# Patient Record
Sex: FEMALE | Race: WHITE | NOT HISPANIC OR LATINO | Employment: OTHER | URBAN - METROPOLITAN AREA
[De-identification: names, ages, dates, MRNs, and addresses within clinical notes are randomized per-mention and may not be internally consistent; named-entity substitution may affect disease eponyms.]

---

## 2018-01-18 ENCOUNTER — HOSPITAL ENCOUNTER (EMERGENCY)
Facility: MEDICAL CENTER | Age: 83
End: 2018-01-18
Attending: EMERGENCY MEDICINE
Payer: MEDICARE

## 2018-01-18 ENCOUNTER — APPOINTMENT (OUTPATIENT)
Dept: RADIOLOGY | Facility: MEDICAL CENTER | Age: 83
End: 2018-01-18
Attending: EMERGENCY MEDICINE
Payer: MEDICARE

## 2018-01-18 VITALS
SYSTOLIC BLOOD PRESSURE: 129 MMHG | RESPIRATION RATE: 18 BRPM | WEIGHT: 140 LBS | HEIGHT: 65 IN | DIASTOLIC BLOOD PRESSURE: 61 MMHG | HEART RATE: 65 BPM | OXYGEN SATURATION: 94 % | BODY MASS INDEX: 23.32 KG/M2 | TEMPERATURE: 97.5 F

## 2018-01-18 DIAGNOSIS — M17.11 OSTEOARTHRITIS OF RIGHT KNEE, UNSPECIFIED OSTEOARTHRITIS TYPE: ICD-10-CM

## 2018-01-18 PROCEDURE — 73562 X-RAY EXAM OF KNEE 3: CPT | Mod: RT

## 2018-01-18 PROCEDURE — 73521 X-RAY EXAM HIPS BI 2 VIEWS: CPT

## 2018-01-18 PROCEDURE — 99284 EMERGENCY DEPT VISIT MOD MDM: CPT

## 2018-01-18 ASSESSMENT — PAIN SCALES - GENERAL: PAINLEVEL_OUTOF10: 0

## 2018-01-18 NOTE — ED PROVIDER NOTES
"ED Provider Note    ED Provider Note    Primary care provider: Jeet Grimes M.D.  Means of arrival: EMS  History obtained from: Patient    CHIEF COMPLAINT  Chief Complaint   Patient presents with   • T-5000 GLF   • Hip Pain     right     Seen at 1:49 AM.   HPI  Rozina Houser is a 89 y.o. female who presents to the Emergency Department after a possible fall. The history is unavailable. The patient was brought in by EMS and reports that the patient had a fall prior to arrival. The patient is without complaints. She is sitting up in the gurney asking why she is here.    Per EMS, the patient received 300 µg of fentanyl and 1 mg of Versed.    REVIEW OF SYSTEMS  See HPI, the patient denies any headache, neck pain, chest pain, back pain, hip pain. She does state her right knee is sore. Full review of systems is unreliable secondary to dementia.    PAST MEDICAL HISTORY   has a past medical history of Colon polyps; Diabetes; GERD (gastroesophageal reflux disease); HTN; Hyperglycemia; Hyperlipidemia; Macular degeneration; PVD (peripheral vascular disease) (CMS-HCC); Skin cancer; and Stroke (CMS-HCC).    SURGICAL HISTORY   has a past surgical history that includes appendectomy; tonsillectomy; athroplasty; cholecystectomy; colectomy; and other orthopedic surgery.    SOCIAL HISTORY  Social History   Substance Use Topics   • Smoking status: Never Smoker   • Smokeless tobacco: Not on file   • Alcohol use No      History   Drug Use No       FAMILY HISTORY  Family History   Problem Relation Age of Onset   • Heart Disease Mother        CURRENT MEDICATIONS  Reviewed.  See Encounter Summary.     ALLERGIES  Allergies   Allergen Reactions   • Fosamax    • Pravachol [Pravastatin Sodium]    • Premarin    • Sulfa Drugs        PHYSICAL EXAM  VITAL SIGNS: /61   Pulse 65   Temp 36.4 °C (97.5 °F)   Resp 18   Ht 1.651 m (5' 5\")   Wt 63.5 kg (140 lb)   SpO2 94%   BMI 23.30 kg/m²   Constitutional: Awake, alert in no " apparent distress.  HENT: Normocephalic, atraumatic, Bilateral external ears normal. Nose normal. No midline cervical tenderness, Nexus Criteria Negative.   Eyes: Conjunctiva normal, non-icteric, EOMI.    Thorax & Lungs: Easy unlabored respirations, Clear to ascultation bilaterally.  Cardiovascular: Regular rate, Regular rhythm, No murmurs, rubs or gallops.  Abdomen:  Soft, nontender, nondistended, normal active bowel sounds.   :    Skin: Visualized skin is  Dry, No erythema, No rash.   Musculoskeletal: Bilateral upper extremities are nontender, bilateral hips have full range of motion and are nontender. There is some mild right knee tenderness on the right. The knee has full flexion and extension. There is no obvious sign of trauma.   Neurologic: Alert, Grossly non-focal.   Psychiatric: Normal affect, Normal mood  Lymphatic:  No cervical LAD      RADIOLOGY  DX-KNEE 3 VIEWS RIGHT   Final Result      1.  Negative for right knee fracture or malalignment      2.  Tricompartmental osteoarthritis      DX-HIP-BILATERAL-WITH PELVIS-2 VIEWS   Final Result      1.  No pelvic or proximal femoral fracture identified      2.  osteoarthritis of both hip joint, both sacroiliac joints, and lumbar spine facet joint        The radiologist's interpretation of all radiological studies have been reviewed by me.    COURSE & MEDICAL DECISION MAKING  Pertinent Labs & Imaging studies reviewed. (See chart for details)    Differential diagnoses include but are not limited to: Knee contusion, knee fracture, hip fracture, pelvic fracture    1:49 AM - Medical record reviewed, patient seen and examined at bedside.    Decision Making:  This is a 89 y.o. year old female who presents after a reported fall. The patient does not have any sign of head trauma and she is currently without complaints. Therefore do not feel that a head CT or cervical spine CT are clinically indicated. There was some suggestion of hip tenderness on the outside reports,  for this reason pelvic x-rays were taken that are negative. The patient has full range of motion of the bilateral hips, I do not suspect occult hip fracture. Her only complaint was right knee soreness, the joint was grossly normal in appearance. She has full range of motion. X-ray show severe osteoarthritis but no acute fracture.    At this point the patient will be discharged back to the nursing facility.The patient's blood pressure is elevated today. >120/80. I have referred them to primary care for follow up.       Discharge Medications:  Discharge Medication List as of 1/18/2018  4:19 AM          The patient will be discharged to home in good condition.    FINAL IMPRESSION  1. Osteoarthritis of right knee, unspecified osteoarthritis type

## 2018-01-18 NOTE — DISCHARGE PLANNING
Medical Social Work  SW asked to assist with transport home .  SW arranged for transportation back to [facility]  PCS completed and [facility] tranportation arranged.    RN notified and transfer packet left with chart.

## 2018-01-18 NOTE — ED NOTES
Pt SIXTO Martin from [facility] for GLF/ right hip pain. EMS reports pt did not hit her head. Pt is oriented to self and place only. Denies right hip pain, moves right leg without difficulty. No shortness or rotation noted. No visible injuries to head. VSS. Pt given 1 mg Versed and 300 mcg fentanyl PTA by EMS.

## 2018-01-18 NOTE — DISCHARGE INSTRUCTIONS
Osteoarthritis  Osteoarthritis is a disease that causes soreness and inflammation of a joint. It occurs when the cartilage at the affected joint wears down. Cartilage acts as a cushion, covering the ends of bones where they meet to form a joint. Osteoarthritis is the most common form of arthritis. It often occurs in older people. The joints affected most often by this condition include those in the:  · Ends of the fingers.  · Thumbs.  · Neck.  · Lower back.  · Knees.  · Hips.  CAUSES   Over time, the cartilage that covers the ends of bones begins to wear away. This causes bone to rub on bone, producing pain and stiffness in the affected joints.   RISK FACTORS  Certain factors can increase your chances of having osteoarthritis, including:  · Older age.  · Excessive body weight.  · Overuse of joints.  · Previous joint injury.  SIGNS AND SYMPTOMS   · Pain, swelling, and stiffness in the joint.  · Over time, the joint may lose its normal shape.  · Small deposits of bone (osteophytes) may grow on the edges of the joint.  · Bits of bone or cartilage can break off and float inside the joint space. This may cause more pain and damage.  DIAGNOSIS   Your health care provider will do a physical exam and ask about your symptoms. Various tests may be ordered, such as:  · X-rays of the affected joint.  · Blood tests to rule out other types of arthritis.  Additional tests may be used to diagnose your condition.  TREATMENT   Goals of treatment are to control pain and improve joint function. Treatment plans may include:  · A prescribed exercise program that allows for rest and joint relief.  · A weight control plan.  · Pain relief techniques, such as:  ¨ Properly applied heat and cold.  ¨ Electric pulses delivered to nerve endings under the skin (transcutaneous electrical nerve stimulation [TENS]).  ¨ Massage.  ¨ Certain nutritional supplements.  · Medicines to control pain, such as:  ¨ Acetaminophen.  ¨ Nonsteroidal  anti-inflammatory drugs (NSAIDs), such as naproxen.  ¨ Narcotic or central-acting agents, such as tramadol.  ¨ Corticosteroids. These can be given orally or as an injection.  · Surgery to reposition the bones and relieve pain (osteotomy) or to remove loose pieces of bone and cartilage. Joint replacement may be needed in advanced states of osteoarthritis.  HOME CARE INSTRUCTIONS   · Take medicines only as directed by your health care provider.  · Maintain a healthy weight. Follow your health care provider's instructions for weight control. This may include dietary instructions.  · Exercise as directed. Your health care provider can recommend specific types of exercise. These may include:  ¨ Strengthening exercises. These are done to strengthen the muscles that support joints affected by arthritis. They can be performed with weights or with exercise bands to add resistance.  ¨ Aerobic activities. These are exercises, such as brisk walking or low-impact aerobics, that get your heart pumping.  ¨ Range-of-motion activities. These keep your joints limber.  ¨ Balance and agility exercises. These help you maintain daily living skills.  · Rest your affected joints as directed by your health care provider.  · Keep all follow-up visits as directed by your health care provider.  SEEK MEDICAL CARE IF:   · Your skin turns red.  · You develop a rash in addition to your joint pain.  · You have worsening joint pain.  · You have a fever along with joint or muscle aches.  SEEK IMMEDIATE MEDICAL CARE IF:  · You have a significant loss of weight or appetite.  · You have night sweats.  FOR MORE INFORMATION   · National [place] of Arthritis and Musculoskeletal and Skin Diseases: www.niams.nih.gov  · National [place] on Aging: www.yanet.nih.gov  · American College of Rheumatology: www.rheumatology.org     This information is not intended to replace advice given to you by your health care provider. Make sure you discuss any questions you  have with your health care provider.     Document Released: 12/18/2006 Document Revised: 01/08/2016 Document Reviewed: 08/25/2014  Elsevier Interactive Patient Education ©2016 [employer]vier Inc.

## 2018-01-18 NOTE — ED NOTES
ELLE to transport pt to [facility]. VSS. NAD noted at time of transfer. All discharge instructions given to patient. Pt's belongings gathered and sent with pt.

## 2018-01-18 NOTE — ED NOTES
Removed pt's PIV, cath tip intact. Pressure and bandage applied. Called report to nurse at [facility], Nettie ALMEIDA to transport pt back to [facility] shortly. Pt updated on POC, pt continues to be disoriented to situation, place and time due to hx of dementia.

## 2019-01-01 ENCOUNTER — HOSPITAL ENCOUNTER (EMERGENCY)
Facility: MEDICAL CENTER | Age: 84
End: 2019-11-02
Attending: EMERGENCY MEDICINE
Payer: MEDICARE

## 2019-01-01 ENCOUNTER — APPOINTMENT (OUTPATIENT)
Dept: RADIOLOGY | Facility: MEDICAL CENTER | Age: 84
End: 2019-01-01
Attending: EMERGENCY MEDICINE
Payer: MEDICARE

## 2019-01-01 VITALS
TEMPERATURE: 98 F | HEART RATE: 77 BPM | WEIGHT: 141.09 LBS | OXYGEN SATURATION: 93 % | DIASTOLIC BLOOD PRESSURE: 67 MMHG | BODY MASS INDEX: 23.48 KG/M2 | SYSTOLIC BLOOD PRESSURE: 139 MMHG | RESPIRATION RATE: 18 BRPM

## 2019-01-01 DIAGNOSIS — S61.212A LACERATION OF RIGHT MIDDLE FINGER WITHOUT FOREIGN BODY WITHOUT DAMAGE TO NAIL, INITIAL ENCOUNTER: ICD-10-CM

## 2019-01-01 PROCEDURE — 73140 X-RAY EXAM OF FINGER(S): CPT | Mod: RT

## 2019-01-01 PROCEDURE — 304999 HCHG REPAIR-SIMPLE/INTERMED LEVEL 1

## 2019-01-01 PROCEDURE — 303353 HCHG DERMABOND SKIN ADHESIVE

## 2019-01-01 PROCEDURE — 99284 EMERGENCY DEPT VISIT MOD MDM: CPT

## 2019-11-02 NOTE — ED NOTES
ERP at bedside. Pt agrees with plan of care discussed by ERP. AIDET acknowledged with patient. Rusty in low position, side rail up for pt safety. Call light within reach. Will continue to monitor.

## 2019-11-02 NOTE — ED PROVIDER NOTES
ED Provider Note    CHIEF COMPLAINT  Chief Complaint   Patient presents with   • GLF   • Open Wound       HPI  Rozina Houser is a 89 y.o. female who presents with pain to the right third phalanx.  The patient has dementia therefore the history is from her family.  The patient is currently in a care facility as I DO NOT RESUSCITATE and unfortunately had a fall this morning.  It was noted the patient had a laceration and injury to her right third phalanx and she was sent into the emerge department for evaluation.  Otherwise the patient has not been complaining of any pain and family states she is at her baseline.  They are unaware of any other injuries.  The history is limited due to her dementia as mentioned above.  REVIEW OF SYSTEMS  See HPI for further details.  Unobtainable secondary to her dementia.     PAST MEDICAL HISTORY  Past Medical History:   Diagnosis Date   • Colon polyps    • Diabetes     pre diabetic   • GERD (gastroesophageal reflux disease)    • HTN    • Hyperglycemia    • Hyperlipidemia    • Macular degeneration    • PVD (peripheral vascular disease) (HCC)    • Skin cancer    • Stroke (HCC)        FAMILY HISTORY  [unfilled]    SOCIAL HISTORY  Social History     Socioeconomic History   • Marital status:      Spouse name: Not on file   • Number of children: Not on file   • Years of education: Not on file   • Highest education level: Not on file   Occupational History   • Not on file   Social Needs   • Financial resource strain: Not on file   • Food insecurity:     Worry: Not on file     Inability: Not on file   • Transportation needs:     Medical: Not on file     Non-medical: Not on file   Tobacco Use   • Smoking status: Never Smoker   Substance and Sexual Activity   • Alcohol use: No   • Drug use: No   • Sexual activity: Not on file   Lifestyle   • Physical activity:     Days per week: Not on file     Minutes per session: Not on file   • Stress: Not on file   Relationships   • Social  connections:     Talks on phone: Not on file     Gets together: Not on file     Attends Christianity service: Not on file     Active member of club or organization: Not on file     Attends meetings of clubs or organizations: Not on file     Relationship status: Not on file   • Intimate partner violence:     Fear of current or ex partner: Not on file     Emotionally abused: Not on file     Physically abused: Not on file     Forced sexual activity: Not on file   Other Topics Concern   • Not on file   Social History Narrative   • Not on file       SURGICAL HISTORY  Past Surgical History:   Procedure Laterality Date   • APPENDECTOMY     • ATHROPLASTY     • CHOLECYSTECTOMY     • COLECTOMY     • OTHER ORTHOPEDIC SURGERY      left knee replacement   • TONSILLECTOMY         CURRENT MEDICATIONS  Home Medications    **Home medications have not yet been reviewed for this encounter**         ALLERGIES  Allergies   Allergen Reactions   • Fosamax    • Pravachol [Pravastatin Sodium]    • Premarin    • Sulfa Drugs        PHYSICAL EXAM  VITAL SIGNS: /80   Pulse 73   Temp 36.7 °C (98 °F) (Temporal)   Resp 18   Wt 64 kg (141 lb 1.5 oz)   BMI 23.48 kg/m²  Room air O2: 90    Constitutional: No acute distress, Non-toxic appearance.   HENT: Normocephalic, Atraumatic, Bilateral external ears normal, Oropharynx moist, No oral exudates, Nose normal.   Eyes: PERRLA, EOMI, Conjunctiva normal, No discharge.   Neck: Normal range of motion, No tenderness, Supple, No stridor.   Lymphatic: No lymphadenopathy noted.   Cardiovascular: Normal heart rate, Normal rhythm, No murmurs, No rubs, No gallops.   Thorax & Lungs: Normal breath sounds, No respiratory distress, No wheezing, No chest tenderness.   Abdomen: Bowel sounds normal, Soft, No tenderness, No masses, No pulsatile masses.   Skin: Laceration to the dorsal aspect of the right third phalanx that goes over the PIP joint measuring approximately 2 cm   Extremities: The laceration described  above otherwise intact distal pulses, No edema, No tenderness, No cyanosis, No clubbing.   Neurologic: The patient is alert, she does speak and open her eyes spontaneously, she moves all 4 extremities to commands.  Family states that she is at her baseline..   Psychiatric: Deferred.     RADIOLOGY/  DX-FINGER(S) 2+ RIGHT   Final Result      1.  No acute fracture identified.      2.  Osteopenia        PROCEDURES  COURSE & MEDICAL DECISION MAKING  Pertinent Labs & Imaging studies reviewed. (See chart for details)  Is a 89-year-old female who presents after a suspected mechanical fall.  The patient is currently in assisted care facility and she is a DO NOT RESUSCITATE.  Family is unaware of any other traumatic injuries except for that to the right third phalanx.  Clinically I do not appreciate any evidence of further injuries as well.  She does have a laceration over the dorsal aspect of the PIP joint.  She has full flexion and extension.  Primary closure was performed with Dermabond.  The x-ray does not show any evidence for fracture.  The patient be discharged back to the assisted care facility with instructions for her to return for any signs of infection or for any further concerns from the fall.    FINAL IMPRESSION  1.  Mechanical fall  2.  2 cm laceration to the right third phalanx           Electronically signed by: Dov Harrison, 11/2/2019 12:06 PM

## 2019-11-02 NOTE — ED NOTES
Pt BIB EMS from care facility with son  Pt rolled out of bed and has a skin tear to R hand, wrapped PTA  Denies pain  Son reports normal mentation for pt, not taking blood thinner. Son reports she is a hospice pt

## 2020-01-01 ENCOUNTER — APPOINTMENT (OUTPATIENT)
Dept: RADIOLOGY | Facility: MEDICAL CENTER | Age: 85
DRG: 177 | End: 2020-01-01
Attending: EMERGENCY MEDICINE
Payer: MEDICARE

## 2020-01-01 ENCOUNTER — HOSPITAL ENCOUNTER (INPATIENT)
Facility: MEDICAL CENTER | Age: 85
LOS: 16 days | DRG: 177 | End: 2020-07-11
Attending: EMERGENCY MEDICINE | Admitting: INTERNAL MEDICINE
Payer: MEDICARE

## 2020-01-01 VITALS
WEIGHT: 146.61 LBS | RESPIRATION RATE: 18 BRPM | OXYGEN SATURATION: 79 % | SYSTOLIC BLOOD PRESSURE: 94 MMHG | BODY MASS INDEX: 26.98 KG/M2 | TEMPERATURE: 98.6 F | DIASTOLIC BLOOD PRESSURE: 44 MMHG | HEIGHT: 62 IN | HEART RATE: 113 BPM

## 2020-01-01 DIAGNOSIS — Z86.73 HISTORY OF CVA (CEREBROVASCULAR ACCIDENT): ICD-10-CM

## 2020-01-01 DIAGNOSIS — M25.551 RIGHT HIP PAIN: ICD-10-CM

## 2020-01-01 DIAGNOSIS — J18.9 PNEUMONIA OF LEFT LOWER LOBE DUE TO INFECTIOUS ORGANISM: ICD-10-CM

## 2020-01-01 DIAGNOSIS — U07.1 COVID-19 VIRUS INFECTION: ICD-10-CM

## 2020-01-01 DIAGNOSIS — J96.01 ACUTE RESPIRATORY FAILURE WITH HYPOXIA (HCC): ICD-10-CM

## 2020-01-01 DIAGNOSIS — Z20.822 SUSPECTED COVID-19 VIRUS INFECTION: ICD-10-CM

## 2020-01-01 LAB
ALBUMIN SERPL BCP-MCNC: 3.8 G/DL (ref 3.2–4.9)
ALP SERPL-CCNC: 81 U/L (ref 30–99)
ALT SERPL-CCNC: 17 U/L (ref 2–50)
ANION GAP SERPL CALC-SCNC: 10 MMOL/L (ref 7–16)
ANION GAP SERPL CALC-SCNC: 13 MMOL/L (ref 7–16)
ANION GAP SERPL CALC-SCNC: 14 MMOL/L (ref 7–16)
ANION GAP SERPL CALC-SCNC: 15 MMOL/L (ref 7–16)
ANION GAP SERPL CALC-SCNC: 16 MMOL/L (ref 7–16)
ANION GAP SERPL CALC-SCNC: 17 MMOL/L (ref 7–16)
APTT PPP: 37.6 SEC (ref 24.7–36)
AST SERPL-CCNC: 27 U/L (ref 12–45)
BASOPHILS # BLD AUTO: 0.3 % (ref 0–1.8)
BASOPHILS # BLD AUTO: 0.4 % (ref 0–1.8)
BASOPHILS # BLD AUTO: 0.4 % (ref 0–1.8)
BASOPHILS # BLD AUTO: 0.5 % (ref 0–1.8)
BASOPHILS # BLD AUTO: 0.6 % (ref 0–1.8)
BASOPHILS # BLD: 0.02 K/UL (ref 0–0.12)
BASOPHILS # BLD: 0.03 K/UL (ref 0–0.12)
BASOPHILS # BLD: 0.03 K/UL (ref 0–0.12)
BASOPHILS # BLD: 0.04 K/UL (ref 0–0.12)
BILIRUB CONJ SERPL-MCNC: 0.3 MG/DL (ref 0.1–0.5)
BILIRUB INDIRECT SERPL-MCNC: 0.5 MG/DL (ref 0–1)
BILIRUB SERPL-MCNC: 0.8 MG/DL (ref 0.1–1.5)
BUN SERPL-MCNC: 15 MG/DL (ref 8–22)
BUN SERPL-MCNC: 18 MG/DL (ref 8–22)
BUN SERPL-MCNC: 18 MG/DL (ref 8–22)
BUN SERPL-MCNC: 21 MG/DL (ref 8–22)
BUN SERPL-MCNC: 21 MG/DL (ref 8–22)
BUN SERPL-MCNC: 22 MG/DL (ref 8–22)
BUN SERPL-MCNC: 25 MG/DL (ref 8–22)
BUN SERPL-MCNC: 27 MG/DL (ref 8–22)
BUN SERPL-MCNC: 28 MG/DL (ref 8–22)
BUN SERPL-MCNC: 29 MG/DL (ref 8–22)
CALCIUM SERPL-MCNC: 8.4 MG/DL (ref 8.4–10.2)
CALCIUM SERPL-MCNC: 8.7 MG/DL (ref 8.4–10.2)
CALCIUM SERPL-MCNC: 8.8 MG/DL (ref 8.4–10.2)
CALCIUM SERPL-MCNC: 8.9 MG/DL (ref 8.4–10.2)
CALCIUM SERPL-MCNC: 8.9 MG/DL (ref 8.4–10.2)
CALCIUM SERPL-MCNC: 9 MG/DL (ref 8.4–10.2)
CALCIUM SERPL-MCNC: 9 MG/DL (ref 8.4–10.2)
CHLORIDE SERPL-SCNC: 100 MMOL/L (ref 96–112)
CHLORIDE SERPL-SCNC: 101 MMOL/L (ref 96–112)
CHLORIDE SERPL-SCNC: 102 MMOL/L (ref 96–112)
CHLORIDE SERPL-SCNC: 103 MMOL/L (ref 96–112)
CHLORIDE SERPL-SCNC: 103 MMOL/L (ref 96–112)
CHLORIDE SERPL-SCNC: 106 MMOL/L (ref 96–112)
CHLORIDE SERPL-SCNC: 113 MMOL/L (ref 96–112)
CHLORIDE SERPL-SCNC: 97 MMOL/L (ref 96–112)
CK SERPL-CCNC: 245 U/L (ref 0–154)
CO2 SERPL-SCNC: 18 MMOL/L (ref 20–33)
CO2 SERPL-SCNC: 20 MMOL/L (ref 20–33)
CO2 SERPL-SCNC: 21 MMOL/L (ref 20–33)
CO2 SERPL-SCNC: 21 MMOL/L (ref 20–33)
CO2 SERPL-SCNC: 22 MMOL/L (ref 20–33)
CO2 SERPL-SCNC: 22 MMOL/L (ref 20–33)
CO2 SERPL-SCNC: 23 MMOL/L (ref 20–33)
CO2 SERPL-SCNC: 24 MMOL/L (ref 20–33)
COVID ORDER STATUS COVID19: NORMAL
COVID ORDER STATUS COVID19: NORMAL
CREAT SERPL-MCNC: 0.79 MG/DL (ref 0.5–1.4)
CREAT SERPL-MCNC: 0.8 MG/DL (ref 0.5–1.4)
CREAT SERPL-MCNC: 0.81 MG/DL (ref 0.5–1.4)
CREAT SERPL-MCNC: 0.86 MG/DL (ref 0.5–1.4)
CREAT SERPL-MCNC: 0.87 MG/DL (ref 0.5–1.4)
CREAT SERPL-MCNC: 0.88 MG/DL (ref 0.5–1.4)
CREAT SERPL-MCNC: 0.9 MG/DL (ref 0.5–1.4)
CREAT SERPL-MCNC: 0.91 MG/DL (ref 0.5–1.4)
CREAT SERPL-MCNC: 0.93 MG/DL (ref 0.5–1.4)
CREAT SERPL-MCNC: 0.95 MG/DL (ref 0.5–1.4)
CRP SERPL HS-MCNC: 3.2 MG/DL (ref 0–0.75)
D DIMER PPP IA.FEU-MCNC: 1.13 UG/ML (FEU) (ref 0–0.5)
EOSINOPHIL # BLD AUTO: 0.01 K/UL (ref 0–0.51)
EOSINOPHIL # BLD AUTO: 0.02 K/UL (ref 0–0.51)
EOSINOPHIL # BLD AUTO: 0.13 K/UL (ref 0–0.51)
EOSINOPHIL NFR BLD: 0.1 % (ref 0–6.9)
EOSINOPHIL NFR BLD: 0.1 % (ref 0–6.9)
EOSINOPHIL NFR BLD: 0.2 % (ref 0–6.9)
EOSINOPHIL NFR BLD: 0.3 % (ref 0–6.9)
EOSINOPHIL NFR BLD: 0.3 % (ref 0–6.9)
EOSINOPHIL NFR BLD: 0.4 % (ref 0–6.9)
EOSINOPHIL NFR BLD: 2.2 % (ref 0–6.9)
ERYTHROCYTE [DISTWIDTH] IN BLOOD BY AUTOMATED COUNT: 40.7 FL (ref 35.9–50)
ERYTHROCYTE [DISTWIDTH] IN BLOOD BY AUTOMATED COUNT: 42.6 FL (ref 35.9–50)
ERYTHROCYTE [DISTWIDTH] IN BLOOD BY AUTOMATED COUNT: 43.6 FL (ref 35.9–50)
ERYTHROCYTE [DISTWIDTH] IN BLOOD BY AUTOMATED COUNT: 43.6 FL (ref 35.9–50)
ERYTHROCYTE [DISTWIDTH] IN BLOOD BY AUTOMATED COUNT: 43.9 FL (ref 35.9–50)
ERYTHROCYTE [DISTWIDTH] IN BLOOD BY AUTOMATED COUNT: 44 FL (ref 35.9–50)
ERYTHROCYTE [DISTWIDTH] IN BLOOD BY AUTOMATED COUNT: 45.1 FL (ref 35.9–50)
ERYTHROCYTE [DISTWIDTH] IN BLOOD BY AUTOMATED COUNT: 45.4 FL (ref 35.9–50)
ERYTHROCYTE [DISTWIDTH] IN BLOOD BY AUTOMATED COUNT: 45.7 FL (ref 35.9–50)
ERYTHROCYTE [DISTWIDTH] IN BLOOD BY AUTOMATED COUNT: 46.2 FL (ref 35.9–50)
FERRITIN SERPL-MCNC: 828 NG/ML (ref 10–291)
FIBRINOGEN PPP-MCNC: 454 MG/DL (ref 215–460)
GLUCOSE BLD-MCNC: 101 MG/DL (ref 65–99)
GLUCOSE BLD-MCNC: 102 MG/DL (ref 65–99)
GLUCOSE BLD-MCNC: 110 MG/DL (ref 65–99)
GLUCOSE BLD-MCNC: 120 MG/DL (ref 65–99)
GLUCOSE BLD-MCNC: 127 MG/DL (ref 65–99)
GLUCOSE BLD-MCNC: 90 MG/DL (ref 65–99)
GLUCOSE BLD-MCNC: 95 MG/DL (ref 65–99)
GLUCOSE BLD-MCNC: 97 MG/DL (ref 65–99)
GLUCOSE BLD-MCNC: 97 MG/DL (ref 65–99)
GLUCOSE SERPL-MCNC: 106 MG/DL (ref 65–99)
GLUCOSE SERPL-MCNC: 113 MG/DL (ref 65–99)
GLUCOSE SERPL-MCNC: 123 MG/DL (ref 65–99)
GLUCOSE SERPL-MCNC: 128 MG/DL (ref 65–99)
GLUCOSE SERPL-MCNC: 141 MG/DL (ref 65–99)
GLUCOSE SERPL-MCNC: 142 MG/DL (ref 65–99)
GLUCOSE SERPL-MCNC: 151 MG/DL (ref 65–99)
GLUCOSE SERPL-MCNC: 155 MG/DL (ref 65–99)
GLUCOSE SERPL-MCNC: 160 MG/DL (ref 65–99)
GLUCOSE SERPL-MCNC: 172 MG/DL (ref 65–99)
HCT VFR BLD AUTO: 45.9 % (ref 37–47)
HCT VFR BLD AUTO: 48.8 % (ref 37–47)
HCT VFR BLD AUTO: 49.8 % (ref 37–47)
HCT VFR BLD AUTO: 50 % (ref 37–47)
HCT VFR BLD AUTO: 50.2 % (ref 37–47)
HCT VFR BLD AUTO: 51.4 % (ref 37–47)
HCT VFR BLD AUTO: 52.5 % (ref 37–47)
HCT VFR BLD AUTO: 52.7 % (ref 37–47)
HCT VFR BLD AUTO: 53.6 % (ref 37–47)
HCT VFR BLD AUTO: 53.9 % (ref 37–47)
HGB BLD-MCNC: 15 G/DL (ref 12–16)
HGB BLD-MCNC: 16.2 G/DL (ref 12–16)
HGB BLD-MCNC: 16.6 G/DL (ref 12–16)
HGB BLD-MCNC: 16.6 G/DL (ref 12–16)
HGB BLD-MCNC: 16.8 G/DL (ref 12–16)
HGB BLD-MCNC: 17 G/DL (ref 12–16)
HGB BLD-MCNC: 17.2 G/DL (ref 12–16)
HGB BLD-MCNC: 17.4 G/DL (ref 12–16)
HGB BLD-MCNC: 17.9 G/DL (ref 12–16)
HGB BLD-MCNC: 17.9 G/DL (ref 12–16)
IL6 SERPL-MCNC: 4.7 PG/ML
IMM GRANULOCYTES # BLD AUTO: 0.02 K/UL (ref 0–0.11)
IMM GRANULOCYTES # BLD AUTO: 0.03 K/UL (ref 0–0.11)
IMM GRANULOCYTES # BLD AUTO: 0.04 K/UL (ref 0–0.11)
IMM GRANULOCYTES # BLD AUTO: 0.11 K/UL (ref 0–0.11)
IMM GRANULOCYTES NFR BLD AUTO: 0.3 % (ref 0–0.9)
IMM GRANULOCYTES NFR BLD AUTO: 0.4 % (ref 0–0.9)
IMM GRANULOCYTES NFR BLD AUTO: 0.6 % (ref 0–0.9)
IMM GRANULOCYTES NFR BLD AUTO: 0.7 % (ref 0–0.9)
IMM GRANULOCYTES NFR BLD AUTO: 1.4 % (ref 0–0.9)
INR PPP: 0.93 (ref 0.87–1.13)
LDH SERPL L TO P-CCNC: 181 U/L (ref 107–266)
LYMPHOCYTES # BLD AUTO: 1.43 K/UL (ref 1–4.8)
LYMPHOCYTES # BLD AUTO: 1.5 K/UL (ref 1–4.8)
LYMPHOCYTES # BLD AUTO: 1.53 K/UL (ref 1–4.8)
LYMPHOCYTES # BLD AUTO: 1.6 K/UL (ref 1–4.8)
LYMPHOCYTES # BLD AUTO: 1.72 K/UL (ref 1–4.8)
LYMPHOCYTES # BLD AUTO: 1.91 K/UL (ref 1–4.8)
LYMPHOCYTES # BLD AUTO: 2.1 K/UL (ref 1–4.8)
LYMPHOCYTES # BLD AUTO: 2.16 K/UL (ref 1–4.8)
LYMPHOCYTES # BLD AUTO: 2.25 K/UL (ref 1–4.8)
LYMPHOCYTES # BLD AUTO: 2.36 K/UL (ref 1–4.8)
LYMPHOCYTES NFR BLD: 19.1 % (ref 22–41)
LYMPHOCYTES NFR BLD: 27.7 % (ref 22–41)
LYMPHOCYTES NFR BLD: 29.6 % (ref 22–41)
LYMPHOCYTES NFR BLD: 30.2 % (ref 22–41)
LYMPHOCYTES NFR BLD: 31.4 % (ref 22–41)
LYMPHOCYTES NFR BLD: 31.5 % (ref 22–41)
LYMPHOCYTES NFR BLD: 31.5 % (ref 22–41)
LYMPHOCYTES NFR BLD: 31.9 % (ref 22–41)
LYMPHOCYTES NFR BLD: 36.8 % (ref 22–41)
LYMPHOCYTES NFR BLD: 38.3 % (ref 22–41)
MAGNESIUM SERPL-MCNC: 1.9 MG/DL (ref 1.5–2.5)
MCH RBC QN AUTO: 29.6 PG (ref 27–33)
MCH RBC QN AUTO: 29.8 PG (ref 27–33)
MCH RBC QN AUTO: 29.9 PG (ref 27–33)
MCH RBC QN AUTO: 30 PG (ref 27–33)
MCH RBC QN AUTO: 30.1 PG (ref 27–33)
MCH RBC QN AUTO: 30.1 PG (ref 27–33)
MCHC RBC AUTO-ENTMCNC: 32 G/DL (ref 33.6–35)
MCHC RBC AUTO-ENTMCNC: 32.3 G/DL (ref 33.6–35)
MCHC RBC AUTO-ENTMCNC: 32.7 G/DL (ref 33.6–35)
MCHC RBC AUTO-ENTMCNC: 33.1 G/DL (ref 33.6–35)
MCHC RBC AUTO-ENTMCNC: 33.1 G/DL (ref 33.6–35)
MCHC RBC AUTO-ENTMCNC: 33.2 G/DL (ref 33.6–35)
MCHC RBC AUTO-ENTMCNC: 33.2 G/DL (ref 33.6–35)
MCHC RBC AUTO-ENTMCNC: 33.4 G/DL (ref 33.6–35)
MCHC RBC AUTO-ENTMCNC: 34 G/DL (ref 33.6–35)
MCHC RBC AUTO-ENTMCNC: 34.5 G/DL (ref 33.6–35)
MCV RBC AUTO: 86.5 FL (ref 81.4–97.8)
MCV RBC AUTO: 88.7 FL (ref 81.4–97.8)
MCV RBC AUTO: 89.2 FL (ref 81.4–97.8)
MCV RBC AUTO: 89.6 FL (ref 81.4–97.8)
MCV RBC AUTO: 90 FL (ref 81.4–97.8)
MCV RBC AUTO: 90.2 FL (ref 81.4–97.8)
MCV RBC AUTO: 90.6 FL (ref 81.4–97.8)
MCV RBC AUTO: 91.8 FL (ref 81.4–97.8)
MCV RBC AUTO: 92.3 FL (ref 81.4–97.8)
MCV RBC AUTO: 93.6 FL (ref 81.4–97.8)
MONOCYTES # BLD AUTO: 0.35 K/UL (ref 0–0.85)
MONOCYTES # BLD AUTO: 0.42 K/UL (ref 0–0.85)
MONOCYTES # BLD AUTO: 0.44 K/UL (ref 0–0.85)
MONOCYTES # BLD AUTO: 0.45 K/UL (ref 0–0.85)
MONOCYTES # BLD AUTO: 0.46 K/UL (ref 0–0.85)
MONOCYTES # BLD AUTO: 0.51 K/UL (ref 0–0.85)
MONOCYTES # BLD AUTO: 0.52 K/UL (ref 0–0.85)
MONOCYTES # BLD AUTO: 0.59 K/UL (ref 0–0.85)
MONOCYTES # BLD AUTO: 0.6 K/UL (ref 0–0.85)
MONOCYTES # BLD AUTO: 0.63 K/UL (ref 0–0.85)
MONOCYTES NFR BLD AUTO: 10.2 % (ref 0–13.4)
MONOCYTES NFR BLD AUTO: 5.6 % (ref 0–13.4)
MONOCYTES NFR BLD AUTO: 6.5 % (ref 0–13.4)
MONOCYTES NFR BLD AUTO: 7.3 % (ref 0–13.4)
MONOCYTES NFR BLD AUTO: 8 % (ref 0–13.4)
MONOCYTES NFR BLD AUTO: 8.4 % (ref 0–13.4)
MONOCYTES NFR BLD AUTO: 8.8 % (ref 0–13.4)
MONOCYTES NFR BLD AUTO: 8.9 % (ref 0–13.4)
MONOCYTES NFR BLD AUTO: 9.2 % (ref 0–13.4)
MONOCYTES NFR BLD AUTO: 9.6 % (ref 0–13.4)
NEUTROPHILS # BLD AUTO: 2.66 K/UL (ref 2–7.15)
NEUTROPHILS # BLD AUTO: 3.03 K/UL (ref 2–7.15)
NEUTROPHILS # BLD AUTO: 3.1 K/UL (ref 2–7.15)
NEUTROPHILS # BLD AUTO: 3.21 K/UL (ref 2–7.15)
NEUTROPHILS # BLD AUTO: 3.24 K/UL (ref 2–7.15)
NEUTROPHILS # BLD AUTO: 3.55 K/UL (ref 2–7.15)
NEUTROPHILS # BLD AUTO: 3.6 K/UL (ref 2–7.15)
NEUTROPHILS # BLD AUTO: 4.01 K/UL (ref 2–7.15)
NEUTROPHILS # BLD AUTO: 4.4 K/UL (ref 2–7.15)
NEUTROPHILS # BLD AUTO: 5.76 K/UL (ref 2–7.15)
NEUTROPHILS NFR BLD: 50.4 % (ref 44–72)
NEUTROPHILS NFR BLD: 52.5 % (ref 44–72)
NEUTROPHILS NFR BLD: 58.4 % (ref 44–72)
NEUTROPHILS NFR BLD: 58.5 % (ref 44–72)
NEUTROPHILS NFR BLD: 59.3 % (ref 44–72)
NEUTROPHILS NFR BLD: 59.7 % (ref 44–72)
NEUTROPHILS NFR BLD: 59.9 % (ref 44–72)
NEUTROPHILS NFR BLD: 61.5 % (ref 44–72)
NEUTROPHILS NFR BLD: 62.1 % (ref 44–72)
NEUTROPHILS NFR BLD: 73.5 % (ref 44–72)
NRBC # BLD AUTO: 0 K/UL
NRBC BLD-RTO: 0 /100 WBC
NT-PROBNP SERPL IA-MCNC: 422 PG/ML (ref 0–125)
PLATELET # BLD AUTO: 163 K/UL (ref 164–446)
PLATELET # BLD AUTO: 175 K/UL (ref 164–446)
PLATELET # BLD AUTO: 182 K/UL (ref 164–446)
PLATELET # BLD AUTO: 193 K/UL (ref 164–446)
PLATELET # BLD AUTO: 195 K/UL (ref 164–446)
PLATELET # BLD AUTO: 207 K/UL (ref 164–446)
PLATELET # BLD AUTO: 213 K/UL (ref 164–446)
PLATELET # BLD AUTO: 219 K/UL (ref 164–446)
PLATELET # BLD AUTO: 240 K/UL (ref 164–446)
PLATELET # BLD AUTO: 243 K/UL (ref 164–446)
PLATELET BLD QL SMEAR: NORMAL
PMV BLD AUTO: 10.1 FL (ref 9–12.9)
PMV BLD AUTO: 10.1 FL (ref 9–12.9)
PMV BLD AUTO: 10.2 FL (ref 9–12.9)
PMV BLD AUTO: 10.3 FL (ref 9–12.9)
PMV BLD AUTO: 9.5 FL (ref 9–12.9)
PMV BLD AUTO: 9.9 FL (ref 9–12.9)
POTASSIUM SERPL-SCNC: 3.3 MMOL/L (ref 3.6–5.5)
POTASSIUM SERPL-SCNC: 3.7 MMOL/L (ref 3.6–5.5)
POTASSIUM SERPL-SCNC: 3.7 MMOL/L (ref 3.6–5.5)
POTASSIUM SERPL-SCNC: 3.8 MMOL/L (ref 3.6–5.5)
POTASSIUM SERPL-SCNC: 3.8 MMOL/L (ref 3.6–5.5)
POTASSIUM SERPL-SCNC: 3.9 MMOL/L (ref 3.6–5.5)
POTASSIUM SERPL-SCNC: 4.2 MMOL/L (ref 3.6–5.5)
POTASSIUM SERPL-SCNC: 4.2 MMOL/L (ref 3.6–5.5)
PROCALCITONIN SERPL-MCNC: 0.1 NG/ML
PROT SERPL-MCNC: 5.8 G/DL (ref 6–8.2)
PROTHROMBIN TIME: 12.6 SEC (ref 12–14.6)
RBC # BLD AUTO: 5 M/UL (ref 4.2–5.4)
RBC # BLD AUTO: 5.41 M/UL (ref 4.2–5.4)
RBC # BLD AUTO: 5.52 M/UL (ref 4.2–5.4)
RBC # BLD AUTO: 5.6 M/UL (ref 4.2–5.4)
RBC # BLD AUTO: 5.61 M/UL (ref 4.2–5.4)
RBC # BLD AUTO: 5.71 M/UL (ref 4.2–5.4)
RBC # BLD AUTO: 5.76 M/UL (ref 4.2–5.4)
RBC # BLD AUTO: 5.84 M/UL (ref 4.2–5.4)
RBC # BLD AUTO: 5.94 M/UL (ref 4.2–5.4)
RBC # BLD AUTO: 6.01 M/UL (ref 4.2–5.4)
RBC BLD AUTO: NORMAL
SARS-COV-2 RNA RESP QL NAA+PROBE: DETECTED
SARS-COV-2 RNA RESP QL NAA+PROBE: DETECTED
SODIUM SERPL-SCNC: 133 MMOL/L (ref 135–145)
SODIUM SERPL-SCNC: 136 MMOL/L (ref 135–145)
SODIUM SERPL-SCNC: 136 MMOL/L (ref 135–145)
SODIUM SERPL-SCNC: 138 MMOL/L (ref 135–145)
SODIUM SERPL-SCNC: 139 MMOL/L (ref 135–145)
SODIUM SERPL-SCNC: 139 MMOL/L (ref 135–145)
SODIUM SERPL-SCNC: 144 MMOL/L (ref 135–145)
SODIUM SERPL-SCNC: 150 MMOL/L (ref 135–145)
SPECIMEN SOURCE: ABNORMAL
SPECIMEN SOURCE: ABNORMAL
TROPONIN T SERPL-MCNC: 25 NG/L (ref 6–19)
WBC # BLD AUTO: 4.6 K/UL (ref 4.8–10.8)
WBC # BLD AUTO: 5.1 K/UL (ref 4.8–10.8)
WBC # BLD AUTO: 5.4 K/UL (ref 4.8–10.8)
WBC # BLD AUTO: 5.8 K/UL (ref 4.8–10.8)
WBC # BLD AUTO: 6.1 K/UL (ref 4.8–10.8)
WBC # BLD AUTO: 6.1 K/UL (ref 4.8–10.8)
WBC # BLD AUTO: 6.2 K/UL (ref 4.8–10.8)
WBC # BLD AUTO: 6.9 K/UL (ref 4.8–10.8)
WBC # BLD AUTO: 7.1 K/UL (ref 4.8–10.8)
WBC # BLD AUTO: 7.8 K/UL (ref 4.8–10.8)

## 2020-01-01 PROCEDURE — 700111 HCHG RX REV CODE 636 W/ 250 OVERRIDE (IP): Performed by: HOSPITALIST

## 2020-01-01 PROCEDURE — 99232 SBSQ HOSP IP/OBS MODERATE 35: CPT | Performed by: INTERNAL MEDICINE

## 2020-01-01 PROCEDURE — 85025 COMPLETE CBC W/AUTO DIFF WBC: CPT

## 2020-01-01 PROCEDURE — 770021 HCHG ROOM/CARE - ISO PRIVATE

## 2020-01-01 PROCEDURE — 82728 ASSAY OF FERRITIN: CPT

## 2020-01-01 PROCEDURE — A9270 NON-COVERED ITEM OR SERVICE: HCPCS | Performed by: INTERNAL MEDICINE

## 2020-01-01 PROCEDURE — 700102 HCHG RX REV CODE 250 W/ 637 OVERRIDE(OP): Performed by: INTERNAL MEDICINE

## 2020-01-01 PROCEDURE — 80048 BASIC METABOLIC PNL TOTAL CA: CPT

## 2020-01-01 PROCEDURE — 83735 ASSAY OF MAGNESIUM: CPT

## 2020-01-01 PROCEDURE — U0003 INFECTIOUS AGENT DETECTION BY NUCLEIC ACID (DNA OR RNA); SEVERE ACUTE RESPIRATORY SYNDROME CORONAVIRUS 2 (SARS-COV-2) (CORONAVIRUS DISEASE [COVID-19]), AMPLIFIED PROBE TECHNIQUE, MAKING USE OF HIGH THROUGHPUT TECHNOLOGIES AS DESCRIBED BY CMS-2020-01-R: HCPCS

## 2020-01-01 PROCEDURE — 700111 HCHG RX REV CODE 636 W/ 250 OVERRIDE (IP): Performed by: EMERGENCY MEDICINE

## 2020-01-01 PROCEDURE — 99231 SBSQ HOSP IP/OBS SF/LOW 25: CPT | Performed by: HOSPITALIST

## 2020-01-01 PROCEDURE — 85384 FIBRINOGEN ACTIVITY: CPT

## 2020-01-01 PROCEDURE — 700111 HCHG RX REV CODE 636 W/ 250 OVERRIDE (IP): Performed by: INTERNAL MEDICINE

## 2020-01-01 PROCEDURE — 99285 EMERGENCY DEPT VISIT HI MDM: CPT

## 2020-01-01 PROCEDURE — 71045 X-RAY EXAM CHEST 1 VIEW: CPT

## 2020-01-01 PROCEDURE — 84484 ASSAY OF TROPONIN QUANT: CPT

## 2020-01-01 PROCEDURE — 82962 GLUCOSE BLOOD TEST: CPT | Mod: 91

## 2020-01-01 PROCEDURE — 85730 THROMBOPLASTIN TIME PARTIAL: CPT

## 2020-01-01 PROCEDURE — 99233 SBSQ HOSP IP/OBS HIGH 50: CPT | Mod: CS,GW | Performed by: HOSPITALIST

## 2020-01-01 PROCEDURE — 700105 HCHG RX REV CODE 258: Performed by: INTERNAL MEDICINE

## 2020-01-01 PROCEDURE — 700105 HCHG RX REV CODE 258: Performed by: EMERGENCY MEDICINE

## 2020-01-01 PROCEDURE — 80076 HEPATIC FUNCTION PANEL: CPT

## 2020-01-01 PROCEDURE — 36415 COLL VENOUS BLD VENIPUNCTURE: CPT

## 2020-01-01 PROCEDURE — 83880 ASSAY OF NATRIURETIC PEPTIDE: CPT

## 2020-01-01 PROCEDURE — 83615 LACTATE (LD) (LDH) ENZYME: CPT

## 2020-01-01 PROCEDURE — 84145 PROCALCITONIN (PCT): CPT

## 2020-01-01 PROCEDURE — C9803 HOPD COVID-19 SPEC COLLECT: HCPCS | Performed by: INTERNAL MEDICINE

## 2020-01-01 PROCEDURE — 99223 1ST HOSP IP/OBS HIGH 75: CPT | Mod: AI,CS,GW | Performed by: INTERNAL MEDICINE

## 2020-01-01 PROCEDURE — 82962 GLUCOSE BLOOD TEST: CPT

## 2020-01-01 PROCEDURE — 83520 IMMUNOASSAY QUANT NOS NONAB: CPT

## 2020-01-01 PROCEDURE — 82550 ASSAY OF CK (CPK): CPT

## 2020-01-01 PROCEDURE — 700111 HCHG RX REV CODE 636 W/ 250 OVERRIDE (IP)

## 2020-01-01 PROCEDURE — 86140 C-REACTIVE PROTEIN: CPT

## 2020-01-01 PROCEDURE — 99232 SBSQ HOSP IP/OBS MODERATE 35: CPT | Mod: CS,GW | Performed by: HOSPITALIST

## 2020-01-01 PROCEDURE — 96365 THER/PROPH/DIAG IV INF INIT: CPT

## 2020-01-01 PROCEDURE — 85610 PROTHROMBIN TIME: CPT

## 2020-01-01 PROCEDURE — C9803 HOPD COVID-19 SPEC COLLECT: HCPCS | Performed by: HOSPITALIST

## 2020-01-01 PROCEDURE — 99233 SBSQ HOSP IP/OBS HIGH 50: CPT | Performed by: HOSPITALIST

## 2020-01-01 PROCEDURE — 85379 FIBRIN DEGRADATION QUANT: CPT

## 2020-01-01 RX ORDER — LORAZEPAM 2 MG/ML
1 INJECTION INTRAMUSCULAR
Status: DISCONTINUED | OUTPATIENT
Start: 2020-01-01 | End: 2020-01-01 | Stop reason: HOSPADM

## 2020-01-01 RX ORDER — POTASSIUM CHLORIDE 20 MEQ/1
20 TABLET, EXTENDED RELEASE ORAL DAILY
Status: DISCONTINUED | OUTPATIENT
Start: 2020-01-01 | End: 2020-01-01

## 2020-01-01 RX ORDER — HYDRALAZINE HYDROCHLORIDE 20 MG/ML
10 INJECTION INTRAMUSCULAR; INTRAVENOUS EVERY 6 HOURS PRN
Status: DISCONTINUED | OUTPATIENT
Start: 2020-01-01 | End: 2020-01-01

## 2020-01-01 RX ORDER — MORPHINE SULFATE 100 MG/5ML
10 SOLUTION ORAL
Status: DISCONTINUED | OUTPATIENT
Start: 2020-01-01 | End: 2020-01-01 | Stop reason: HOSPADM

## 2020-01-01 RX ORDER — HYDROCHLOROTHIAZIDE 25 MG/1
25 TABLET ORAL DAILY
Status: DISCONTINUED | OUTPATIENT
Start: 2020-01-01 | End: 2020-01-01

## 2020-01-01 RX ORDER — ACETAMINOPHEN 160 MG/5ML
650 SUSPENSION ORAL EVERY 6 HOURS PRN
Status: DISCONTINUED | OUTPATIENT
Start: 2020-01-01 | End: 2020-01-01

## 2020-01-01 RX ORDER — DEXTROSE MONOHYDRATE 25 G/50ML
50 INJECTION, SOLUTION INTRAVENOUS
Status: DISCONTINUED | OUTPATIENT
Start: 2020-01-01 | End: 2020-01-01

## 2020-01-01 RX ORDER — ONDANSETRON 4 MG/1
4 TABLET, ORALLY DISINTEGRATING ORAL EVERY 6 HOURS PRN
Status: DISCONTINUED | OUTPATIENT
Start: 2020-01-01 | End: 2020-01-01

## 2020-01-01 RX ORDER — AZITHROMYCIN 500 MG/5ML
500 INJECTION, POWDER, LYOPHILIZED, FOR SOLUTION INTRAVENOUS EVERY 24 HOURS
Status: DISCONTINUED | OUTPATIENT
Start: 2020-01-01 | End: 2020-01-01

## 2020-01-01 RX ORDER — ONDANSETRON 2 MG/ML
4 INJECTION INTRAMUSCULAR; INTRAVENOUS EVERY 6 HOURS PRN
Status: DISCONTINUED | OUTPATIENT
Start: 2020-01-01 | End: 2020-01-01

## 2020-01-01 RX ORDER — ATROPINE SULFATE 10 MG/ML
2 SOLUTION/ DROPS OPHTHALMIC EVERY 4 HOURS PRN
Status: DISCONTINUED | OUTPATIENT
Start: 2020-01-01 | End: 2020-01-01 | Stop reason: HOSPADM

## 2020-01-01 RX ORDER — SODIUM CHLORIDE 9 MG/ML
1000 INJECTION, SOLUTION INTRAVENOUS ONCE
Status: COMPLETED | OUTPATIENT
Start: 2020-01-01 | End: 2020-01-01

## 2020-01-01 RX ORDER — SODIUM CHLORIDE, SODIUM LACTATE, POTASSIUM CHLORIDE, CALCIUM CHLORIDE 600; 310; 30; 20 MG/100ML; MG/100ML; MG/100ML; MG/100ML
INJECTION, SOLUTION INTRAVENOUS CONTINUOUS
Status: DISCONTINUED | OUTPATIENT
Start: 2020-01-01 | End: 2020-01-01

## 2020-01-01 RX ORDER — MORPHINE SULFATE 100 MG/5ML
20 SOLUTION ORAL
Status: DISCONTINUED | OUTPATIENT
Start: 2020-01-01 | End: 2020-01-01 | Stop reason: HOSPADM

## 2020-01-01 RX ORDER — POLYETHYLENE GLYCOL 3350 17 G/17G
1 POWDER, FOR SOLUTION ORAL
Status: DISCONTINUED | OUTPATIENT
Start: 2020-01-01 | End: 2020-01-01

## 2020-01-01 RX ORDER — ACETAMINOPHEN 325 MG/1
650 TABLET ORAL EVERY 4 HOURS PRN
Status: DISCONTINUED | OUTPATIENT
Start: 2020-01-01 | End: 2020-01-01 | Stop reason: HOSPADM

## 2020-01-01 RX ORDER — BISACODYL 10 MG
10 SUPPOSITORY, RECTAL RECTAL
Status: DISCONTINUED | OUTPATIENT
Start: 2020-01-01 | End: 2020-01-01

## 2020-01-01 RX ORDER — LORAZEPAM 2 MG/ML
1 CONCENTRATE ORAL
Status: DISCONTINUED | OUTPATIENT
Start: 2020-01-01 | End: 2020-01-01 | Stop reason: HOSPADM

## 2020-01-01 RX ORDER — MORPHINE SULFATE 4 MG/ML
4-8 INJECTION, SOLUTION INTRAMUSCULAR; INTRAVENOUS EVERY 4 HOURS PRN
Status: DISCONTINUED | OUTPATIENT
Start: 2020-01-01 | End: 2020-01-01

## 2020-01-01 RX ORDER — AMOXICILLIN 250 MG
2 CAPSULE ORAL 2 TIMES DAILY
Status: DISCONTINUED | OUTPATIENT
Start: 2020-01-01 | End: 2020-01-01

## 2020-01-01 RX ORDER — ACETAMINOPHEN 650 MG/1
650 SUPPOSITORY RECTAL EVERY 4 HOURS PRN
Status: DISCONTINUED | OUTPATIENT
Start: 2020-01-01 | End: 2020-01-01 | Stop reason: HOSPADM

## 2020-01-01 RX ORDER — ACETAMINOPHEN 325 MG/1
650 TABLET ORAL EVERY 6 HOURS PRN
Status: DISCONTINUED | OUTPATIENT
Start: 2020-01-01 | End: 2020-01-01

## 2020-01-01 RX ORDER — AZITHROMYCIN 500 MG/1
INJECTION, POWDER, LYOPHILIZED, FOR SOLUTION INTRAVENOUS
Status: COMPLETED
Start: 2020-01-01 | End: 2020-01-01

## 2020-01-01 RX ADMIN — MORPHINE SULFATE 10 MG: 100 SOLUTION ORAL at 01:48

## 2020-01-01 RX ADMIN — ENOXAPARIN SODIUM 40 MG: 40 INJECTION SUBCUTANEOUS at 05:48

## 2020-01-01 RX ADMIN — MORPHINE SULFATE 10 MG: 100 SOLUTION ORAL at 21:09

## 2020-01-01 RX ADMIN — DOCUSATE SODIUM - SENNOSIDES 2 TABLET: 50; 8.6 TABLET, FILM COATED ORAL at 18:09

## 2020-01-01 RX ADMIN — AZITHROMYCIN MONOHYDRATE 500 MG: 500 INJECTION, POWDER, LYOPHILIZED, FOR SOLUTION INTRAVENOUS at 00:09

## 2020-01-01 RX ADMIN — MORPHINE SULFATE 10 MG: 100 SOLUTION ORAL at 13:36

## 2020-01-01 RX ADMIN — SODIUM CHLORIDE, POTASSIUM CHLORIDE, SODIUM LACTATE AND CALCIUM CHLORIDE: 600; 310; 30; 20 INJECTION, SOLUTION INTRAVENOUS at 16:19

## 2020-01-01 RX ADMIN — CEFTRIAXONE SODIUM 1 G: 1 INJECTION, POWDER, FOR SOLUTION INTRAMUSCULAR; INTRAVENOUS at 22:10

## 2020-01-01 RX ADMIN — MORPHINE SULFATE 10 MG: 100 SOLUTION ORAL at 17:15

## 2020-01-01 RX ADMIN — ENOXAPARIN SODIUM 40 MG: 40 INJECTION SUBCUTANEOUS at 06:21

## 2020-01-01 RX ADMIN — ENOXAPARIN SODIUM 40 MG: 40 INJECTION SUBCUTANEOUS at 05:28

## 2020-01-01 RX ADMIN — DOCUSATE SODIUM - SENNOSIDES 2 TABLET: 50; 8.6 TABLET, FILM COATED ORAL at 17:28

## 2020-01-01 RX ADMIN — MORPHINE SULFATE 10 MG: 100 SOLUTION ORAL at 16:51

## 2020-01-01 RX ADMIN — HYDROCHLOROTHIAZIDE 25 MG: 25 TABLET ORAL at 06:16

## 2020-01-01 RX ADMIN — MORPHINE SULFATE 10 MG: 100 SOLUTION ORAL at 04:35

## 2020-01-01 RX ADMIN — LORAZEPAM 1 MG: 2 LIQUID ORAL at 08:08

## 2020-01-01 RX ADMIN — LORAZEPAM 1 MG: 2 LIQUID ORAL at 12:05

## 2020-01-01 RX ADMIN — HYDROCHLOROTHIAZIDE 25 MG: 25 TABLET ORAL at 05:16

## 2020-01-01 RX ADMIN — DOCUSATE SODIUM - SENNOSIDES 2 TABLET: 50; 8.6 TABLET, FILM COATED ORAL at 05:48

## 2020-01-01 RX ADMIN — MORPHINE SULFATE 10 MG: 100 SOLUTION ORAL at 18:57

## 2020-01-01 RX ADMIN — DOCUSATE SODIUM - SENNOSIDES 2 TABLET: 50; 8.6 TABLET, FILM COATED ORAL at 05:16

## 2020-01-01 RX ADMIN — ACETAMINOPHEN 650 MG: 160 SUSPENSION ORAL at 20:15

## 2020-01-01 RX ADMIN — MORPHINE SULFATE 10 MG: 100 SOLUTION ORAL at 12:06

## 2020-01-01 RX ADMIN — LORAZEPAM 1 MG: 2 LIQUID ORAL at 01:47

## 2020-01-01 RX ADMIN — ENOXAPARIN SODIUM 40 MG: 40 INJECTION SUBCUTANEOUS at 05:13

## 2020-01-01 RX ADMIN — ENOXAPARIN SODIUM 40 MG: 40 INJECTION SUBCUTANEOUS at 05:16

## 2020-01-01 RX ADMIN — POTASSIUM CHLORIDE 20 MEQ: 1500 TABLET, EXTENDED RELEASE ORAL at 05:26

## 2020-01-01 RX ADMIN — ENOXAPARIN SODIUM 40 MG: 40 INJECTION SUBCUTANEOUS at 05:39

## 2020-01-01 RX ADMIN — ACETAMINOPHEN 650 MG: 325 TABLET, FILM COATED ORAL at 15:21

## 2020-01-01 RX ADMIN — POTASSIUM CHLORIDE 20 MEQ: 1500 TABLET, EXTENDED RELEASE ORAL at 09:30

## 2020-01-01 RX ADMIN — ENOXAPARIN SODIUM 40 MG: 40 INJECTION SUBCUTANEOUS at 05:41

## 2020-01-01 RX ADMIN — DOCUSATE SODIUM - SENNOSIDES 2 TABLET: 50; 8.6 TABLET, FILM COATED ORAL at 06:14

## 2020-01-01 RX ADMIN — MORPHINE SULFATE 10 MG: 100 SOLUTION ORAL at 19:15

## 2020-01-01 RX ADMIN — ENOXAPARIN SODIUM 40 MG: 40 INJECTION SUBCUTANEOUS at 06:14

## 2020-01-01 RX ADMIN — DOCUSATE SODIUM - SENNOSIDES 2 TABLET: 50; 8.6 TABLET, FILM COATED ORAL at 18:08

## 2020-01-01 RX ADMIN — BISACODYL 10 MG: 10 SUPPOSITORY RECTAL at 10:15

## 2020-01-01 RX ADMIN — SODIUM CHLORIDE, POTASSIUM CHLORIDE, SODIUM LACTATE AND CALCIUM CHLORIDE: 600; 310; 30; 20 INJECTION, SOLUTION INTRAVENOUS at 08:05

## 2020-01-01 RX ADMIN — ENOXAPARIN SODIUM 40 MG: 40 INJECTION SUBCUTANEOUS at 05:26

## 2020-01-01 RX ADMIN — SODIUM CHLORIDE 1000 ML: 9 INJECTION, SOLUTION INTRAVENOUS at 20:38

## 2020-01-01 RX ADMIN — MORPHINE SULFATE 10 MG: 100 SOLUTION ORAL at 06:24

## 2020-01-01 RX ADMIN — LORAZEPAM 1 MG: 2 LIQUID ORAL at 21:08

## 2020-01-01 RX ADMIN — MORPHINE SULFATE 4 MG: 4 INJECTION INTRAVENOUS at 11:18

## 2020-01-01 RX ADMIN — MORPHINE SULFATE 10 MG: 100 SOLUTION ORAL at 20:45

## 2020-01-01 RX ADMIN — HYDROCHLOROTHIAZIDE 25 MG: 25 TABLET ORAL at 05:57

## 2020-01-01 RX ADMIN — SODIUM CHLORIDE, POTASSIUM CHLORIDE, SODIUM LACTATE AND CALCIUM CHLORIDE: 600; 310; 30; 20 INJECTION, SOLUTION INTRAVENOUS at 05:13

## 2020-01-01 RX ADMIN — ENOXAPARIN SODIUM 40 MG: 40 INJECTION SUBCUTANEOUS at 05:57

## 2020-01-01 RX ADMIN — ENOXAPARIN SODIUM 40 MG: 40 INJECTION SUBCUTANEOUS at 06:37

## 2020-01-01 RX ADMIN — DOCUSATE SODIUM - SENNOSIDES 2 TABLET: 50; 8.6 TABLET, FILM COATED ORAL at 17:26

## 2020-01-01 RX ADMIN — MORPHINE SULFATE 10 MG: 100 SOLUTION ORAL at 18:33

## 2020-01-01 RX ADMIN — ACETAMINOPHEN 650 MG: 160 SUSPENSION ORAL at 15:50

## 2020-01-01 RX ADMIN — ACETAMINOPHEN 650 MG: 160 SUSPENSION ORAL at 05:40

## 2020-01-01 RX ADMIN — POTASSIUM CHLORIDE 20 MEQ: 1500 TABLET, EXTENDED RELEASE ORAL at 06:14

## 2020-01-01 RX ADMIN — HYDROCHLOROTHIAZIDE 25 MG: 25 TABLET ORAL at 05:48

## 2020-01-01 RX ADMIN — MORPHINE SULFATE 10 MG: 100 SOLUTION ORAL at 08:14

## 2020-01-01 ASSESSMENT — PAIN SCALES - PAIN ASSESSMENT IN ADVANCED DEMENTIA (PAINAD)
TOTALSCORE: 5
FACIALEXPRESSION: FACIAL GRIMACING
BODYLANGUAGE: RELAXED
NEGVOCALIZATION: OCCASIONAL MOAN/GROAN, LOW SPEECH, NEGATIVE/DISAPPROVING QUALITY
BODYLANGUAGE: RELAXED
CONSOLABILITY: NO NEED TO CONSOLE
NEGVOCALIZATION: OCCASIONAL MOAN/GROAN, LOW SPEECH, NEGATIVE/DISAPPROVING QUALITY
BREATHING: NORMAL
TOTALSCORE: 1
BODYLANGUAGE: TENSE, DISTRESSED PACING, FIDGETING
BREATHING: NORMAL
NEGVOCALIZATION: REPEATED TROUBLED CALLING OUT, LOUD MOANING/GROANING, CRYING
FACIALEXPRESSION: SMILING OR INEXPRESSIVE
BODYLANGUAGE: RELAXED
NEGVOCALIZATION: OCCASIONAL MOAN/GROAN, LOW SPEECH, NEGATIVE/DISAPPROVING QUALITY
FACIALEXPRESSION: SMILING OR INEXPRESSIVE
NEGVOCALIZATION: OCCASIONAL MOAN/GROAN, LOW SPEECH, NEGATIVE/DISAPPROVING QUALITY
TOTALSCORE: 0
BREATHING: NORMAL
CONSOLABILITY: NO NEED TO CONSOLE
CONSOLABILITY: UNABLE TO CONSOLE, DISTRACT OR REASSURE
CONSOLABILITY: NO NEED TO CONSOLE
CONSOLABILITY: NO NEED TO CONSOLE
FACIALEXPRESSION: FACIAL GRIMACING
BREATHING: OCCASIONAL LABORED BREATHING, SHORT PERIOD OF HYPERVENTILATION
FACIALEXPRESSION: SAD, FRIGHTENED, FROWN
BREATHING: OCCASIONAL LABORED BREATHING, SHORT PERIOD OF HYPERVENTILATION
CONSOLABILITY: NO NEED TO CONSOLE
NEGVOCALIZATION: OCCASIONAL MOAN/GROAN, LOW SPEECH, NEGATIVE/DISAPPROVING QUALITY
TOTALSCORE: 1
NEGVOCALIZATION: REPEATED TROUBLED CALLING OUT, LOUD MOANING/GROANING, CRYING
CONSOLABILITY: NO NEED TO CONSOLE
TOTALSCORE: 1
BREATHING: OCCASIONAL LABORED BREATHING, SHORT PERIOD OF HYPERVENTILATION
BREATHING: NORMAL
CONSOLABILITY: NO NEED TO CONSOLE
BODYLANGUAGE: RELAXED
CONSOLABILITY: UNABLE TO CONSOLE, DISTRACT OR REASSURE
FACIALEXPRESSION: SMILING OR INEXPRESSIVE
TOTALSCORE: 1
BREATHING: NORMAL
TOTALSCORE: 1
NEGVOCALIZATION: OCCASIONAL MOAN/GROAN, LOW SPEECH, NEGATIVE/DISAPPROVING QUALITY
BREATHING: NORMAL
CONSOLABILITY: NO NEED TO CONSOLE
CONSOLABILITY: DISTRACTED OR REASSURED BY VOICE/TOUCH
CONSOLABILITY: NO NEED TO CONSOLE
FACIALEXPRESSION: SAD, FRIGHTENED, FROWN
CONSOLABILITY: NO NEED TO CONSOLE
TOTALSCORE: 1
BREATHING: OCCASIONAL LABORED BREATHING, SHORT PERIOD OF HYPERVENTILATION
FACIALEXPRESSION: SMILING OR INEXPRESSIVE
TOTALSCORE: 0
FACIALEXPRESSION: SMILING OR INEXPRESSIVE
BODYLANGUAGE: TENSE, DISTRESSED PACING, FIDGETING
BREATHING: NORMAL
FACIALEXPRESSION: SMILING OR INEXPRESSIVE
BREATHING: NORMAL
TOTALSCORE: 2
BODYLANGUAGE: RELAXED
FACIALEXPRESSION: SMILING OR INEXPRESSIVE
CONSOLABILITY: NO NEED TO CONSOLE
BODYLANGUAGE: RELAXED
FACIALEXPRESSION: SMILING OR INEXPRESSIVE
NEGVOCALIZATION: OCCASIONAL MOAN/GROAN, LOW SPEECH, NEGATIVE/DISAPPROVING QUALITY
CONSOLABILITY: NO NEED TO CONSOLE
BODYLANGUAGE: RELAXED
BREATHING: NORMAL
NEGVOCALIZATION: OCCASIONAL MOAN/GROAN, LOW SPEECH, NEGATIVE/DISAPPROVING QUALITY
BODYLANGUAGE: RIGID, FISTS CLENCHED, KNEES UP, PUSHING/PULLING AWAY, STRIKES OUT
FACIALEXPRESSION: SMILING OR INEXPRESSIVE
BODYLANGUAGE: RELAXED
BREATHING: OCCASIONAL LABORED BREATHING, SHORT PERIOD OF HYPERVENTILATION
TOTALSCORE: 1
CONSOLABILITY: NO NEED TO CONSOLE
BODYLANGUAGE: RELAXED
NEGVOCALIZATION: REPEATED TROUBLED CALLING OUT, LOUD MOANING/GROANING, CRYING
FACIALEXPRESSION: SMILING OR INEXPRESSIVE
FACIALEXPRESSION: SMILING OR INEXPRESSIVE
BREATHING: NORMAL
TOTALSCORE: 1
TOTALSCORE: 9
TOTALSCORE: 9
FACIALEXPRESSION: SMILING OR INEXPRESSIVE
FACIALEXPRESSION: FACIAL GRIMACING
TOTALSCORE: 0
BODYLANGUAGE: RELAXED
CONSOLABILITY: NO NEED TO CONSOLE
NEGVOCALIZATION: OCCASIONAL MOAN/GROAN, LOW SPEECH, NEGATIVE/DISAPPROVING QUALITY
BODYLANGUAGE: RIGID, FISTS CLENCHED, KNEES UP, PUSHING/PULLING AWAY, STRIKES OUT
BREATHING: OCCASIONAL LABORED BREATHING, SHORT PERIOD OF HYPERVENTILATION
FACIALEXPRESSION: SMILING OR INEXPRESSIVE
BODYLANGUAGE: RELAXED
NEGVOCALIZATION: OCCASIONAL MOAN/GROAN, LOW SPEECH, NEGATIVE/DISAPPROVING QUALITY
TOTALSCORE: 1
BODYLANGUAGE: RELAXED
BREATHING: NORMAL
BODYLANGUAGE: RELAXED
NEGVOCALIZATION: REPEATED TROUBLED CALLING OUT, LOUD MOANING/GROANING, CRYING
TOTALSCORE: 4
BREATHING: NORMAL
TOTALSCORE: 1
TOTALSCORE: 8
BODYLANGUAGE: RELAXED
CONSOLABILITY: UNABLE TO CONSOLE, DISTRACT OR REASSURE
BREATHING: NORMAL
BODYLANGUAGE: RELAXED
NEGVOCALIZATION: REPEATED TROUBLED CALLING OUT, LOUD MOANING/GROANING, CRYING
FACIALEXPRESSION: SMILING OR INEXPRESSIVE
CONSOLABILITY: NO NEED TO CONSOLE

## 2020-01-01 ASSESSMENT — COGNITIVE AND FUNCTIONAL STATUS - GENERAL
MOBILITY SCORE: 7
STANDING UP FROM CHAIR USING ARMS: TOTAL
CLIMB 3 TO 5 STEPS WITH RAILING: TOTAL
MOVING TO AND FROM BED TO CHAIR: UNABLE
TURNING FROM BACK TO SIDE WHILE IN FLAT BAD: A LOT
EATING MEALS: TOTAL
DRESSING REGULAR UPPER BODY CLOTHING: TOTAL
MOVING FROM LYING ON BACK TO SITTING ON SIDE OF FLAT BED: UNABLE
TOILETING: TOTAL
SUGGESTED CMS G CODE MODIFIER DAILY ACTIVITY: CN
DAILY ACTIVITIY SCORE: 6
HELP NEEDED FOR BATHING: TOTAL
WALKING IN HOSPITAL ROOM: TOTAL
PERSONAL GROOMING: TOTAL
SUGGESTED CMS G CODE MODIFIER MOBILITY: CM
DRESSING REGULAR LOWER BODY CLOTHING: TOTAL

## 2020-01-01 ASSESSMENT — FIBROSIS 4 INDEX: FIB4 SCORE: 3.74

## 2020-06-25 PROBLEM — J96.01 ACUTE RESPIRATORY FAILURE WITH HYPOXIA (HCC): Status: ACTIVE | Noted: 2020-01-01

## 2020-06-25 PROBLEM — R73.9 HYPERGLYCEMIA: Status: ACTIVE | Noted: 2020-01-01

## 2020-06-25 PROBLEM — U07.1 COVID-19 VIRUS INFECTION: Status: ACTIVE | Noted: 2020-01-01

## 2020-06-26 NOTE — PROGRESS NOTES
2300: Report received by OTSIN Pan. Plan of care discussed. Patient not on floor, awaiting transport from E.D.    2320: Patient on floor. Transported to bed via gurney and slideboard. Patient is non-verbal, but appears calm.     2326: Assessment complete. Lung and heart sounds distant. Unable to complete admit profile due to patient's mentation.

## 2020-06-26 NOTE — PROGRESS NOTES
2 RN Skin Check    2 RN skin check complete.   Devices in place: Nasal Cannula.  Skin assessed under devices: yes.  Confirmed pressure ulcers found on: none.  New potential pressure ulcers noted on none. Wound consult placed N/A.  The following interventions in place Pillows.    BLE are mohsen, skin tag with old drainage noted on LLQ of abd. Sacrum was pink and blanching. No areas of concern noted.

## 2020-06-26 NOTE — H&P
Hospital Medicine History & Physical Note    Date of Service  6/25/2020    Primary Care Physician  Jeet Grimes M.D.    Code Status  DNAR/DNI    Chief Complaint  Chief Complaint   Patient presents with   • Other     from memory care with + covid   • Hypoxemia       History of Presenting Illness  89 y.o. female who presented 6/25/2020 with positive COVID test and hypoxia.  Patient does have a history of dementia, is at a memory care unit.  Is currently nonverbal, I did read that that was her baseline.  Family members did reiterate that she was DNR.  Patient was on hospice at some point but is no longer.  Apparently there was an outbreak of COVID at the memory care unit.  I did discuss the case including labs and imaging with the ER physician.    Review of Systems  Review of Systems   Unable to perform ROS: Dementia       Past Medical History   has a past medical history of Colon polyps, Diabetes, GERD (gastroesophageal reflux disease), HTN, Hyperglycemia, Hyperlipidemia, Macular degeneration, PVD (peripheral vascular disease) (HCC), Skin cancer, and Stroke (HCC).    Surgical History   has a past surgical history that includes appendectomy; tonsillectomy; arthroplasty; cholecystectomy; colectomy; and other orthopedic surgery.     Family History  family history includes Heart Disease in her mother.     Social History   reports that she has never smoked. She has never used smokeless tobacco. She reports that she does not drink alcohol or use drugs.    Allergies  Allergies   Allergen Reactions   • Alendronate Sodium    • Fosamax    • Pravachol [Pravastatin Sodium]    • Premarin    • Sulfa Drugs        Medications  Prior to Admission Medications   Prescriptions Last Dose Informant Patient Reported? Taking?   Dextromethorphan-Guaifenesin (TUSSIN COUGH DM PO)   Yes No   Sig: Take 10 mL by mouth every 6 hours as needed (cough).   Magnesium Hydroxide (MILK OF MAGNESIA PO)   Yes No   Sig: Take 30 mg by mouth as  needed (constipation).   Multiple Vitamin (MULTI VITAMIN DAILY PO)   Yes No   Sig: Take 1 Cap by mouth every day at 6 PM.   Multiple Vitamins-Minerals (OCUVITE) TABS   Yes No   Sig: Take 1 Tab by mouth every day.   Nystatin Powder   Yes No   Sig: Apply 1 Application to affected area(s) 2 times a day. Under breasts   acetaminophen (TYLENOL) 325 MG Tab   Yes No   Sig: Take 650 mg by mouth every four hours as needed for Mild Pain.   aspirin (CHILDRENS ASPIRIN) 81 MG CHEW   Yes No   Sig: Take 81 mg by mouth every day.   docusate sodium (COLACE) 100 MG Cap   Yes No   Sig: Take 100 mg by mouth every day.   hydrochlorothiazide (HYDRODIURIL) 25 MG Tab   Yes No   Sig: Take 25 mg by mouth every day.   hydrocortisone (ANUSOL-HC) 25 MG Suppos   Yes No   Sig: Insert 25 mg in rectum every 12 hours as needed (rectal pain).   loperamide (IMODIUM) 2 MG Cap   Yes No   Sig: Take 2 mg by mouth 4 times a day as needed for Diarrhea.   magnesium sulfate (EPSOM SALT) crystals   Yes No   Sig: Apply 1 Dose to affected area(s) every day at 6 PM. Podiatrist ordered for daily soaking of feet.   mupirocin (BACTROBAN) 2 % Ointment   Yes No   Sig: Apply 1 Application to affected area(s) every day. apply to effected area   polyethylene glycol 3350 (MIRALAX) Powder   Yes No   Sig: Take 17 g by mouth every day.   spironolactone (ALDACTONE) 25 MG Tab   Yes No   Sig: Take 25 mg by mouth every day.      Facility-Administered Medications: None       Physical Exam  Temp:  [37.1 °C (98.7 °F)] 37.1 °C (98.7 °F)  Pulse:  [67-76] 67  Resp:  [16-26] 16  BP: (125-141)/(56-96) 131/56  SpO2:  [93 %-97 %] 96 %    Physical Exam  Vitals signs and nursing note reviewed.   Constitutional:       General: She is not in acute distress.     Appearance: She is well-developed. She is not diaphoretic.   HENT:      Head: Normocephalic and atraumatic.      Right Ear: External ear normal.      Left Ear: External ear normal.      Nose: No congestion or rhinorrhea.       Mouth/Throat:      Mouth: Mucous membranes are moist.      Pharynx: No oropharyngeal exudate.   Eyes:      General:         Right eye: No discharge.         Left eye: No discharge.      Extraocular Movements: Extraocular movements intact.   Neck:      Musculoskeletal: Normal range of motion and neck supple.      Trachea: No tracheal deviation.   Cardiovascular:      Rate and Rhythm: Normal rate and regular rhythm.      Heart sounds: No murmur. No friction rub. No gallop.    Pulmonary:      Effort: Pulmonary effort is normal. No respiratory distress.      Breath sounds: Normal breath sounds. No stridor. No wheezing or rales.   Abdominal:      General: Bowel sounds are normal. There is no distension.      Palpations: Abdomen is soft.   Musculoskeletal:      Right lower leg: No edema.      Left lower leg: No edema.   Lymphadenopathy:      Cervical: No cervical adenopathy.   Skin:     General: Skin is warm and dry.      Findings: No erythema or rash.   Neurological:      Comments: Awake, nonverbal    Psychiatric:         Speech: She is noncommunicative.         Laboratory:  Recent Labs     06/25/20 1947   WBC 6.2   RBC 5.41*   HEMOGLOBIN 16.2*   HEMATOCRIT 48.8*   MCV 90.2   MCH 29.9   MCHC 33.2*   RDW 45.1   PLATELETCT 243   MPV 9.9     Recent Labs     06/25/20 1947   SODIUM 139   POTASSIUM 3.9   CHLORIDE 101   CO2 23   GLUCOSE 155*   BUN 21   CREATININE 0.93   CALCIUM 9.0     Recent Labs     06/25/20 1947   GLUCOSE 155*         No results for input(s): NTPROBNP in the last 72 hours.      No results for input(s): TROPONINT in the last 72 hours.    Imaging:  DX-CHEST-PORTABLE (1 VIEW)   Final Result      Left basilar opacities, consistent with pneumonia.            Assessment/Plan:    * COVID-19 virus infection- (present on admission)  Assessment & Plan  -Test came back positive today, outpatient testing  -Keep patient isolated  -Causing acute respiratory failure  -Concerning that this is also associated with a  pneumonia  -Patient does not have significant renal failure, at this point in time I think we can avoid IV fluids  -Not causing sepsis    Acute respiratory failure with hypoxia (HCC)- (present on admission)  Assessment & Plan  -Due to COVID-19 infection  -Patient was satting 88% on room air, improved with low flow oxygen  -I did personally review her chest x-ray, did note a left base opacity, concerning for pneumonia  -Obtain procalcitonin  -Start IV Rocephin and azithromycin    Hyperglycemia- (present on admission)  Assessment & Plan  -Is not on diabetic medications  -Start diabetic diet as well as insulin sliding scale  -Adjust as needed    Dementia (HCC)- (present on admission)  Assessment & Plan  -Chronic, patient is DNR    HTN (hypertension), benign- (present on admission)  Assessment & Plan  -Continue home hydrochlorothiazide  -Start PRN hydralazine  -Adjust as needed

## 2020-06-26 NOTE — CARE PLAN
Problem: Respiratory:  Goal: Respiratory status will improve  Outcome: PROGRESSING AS EXPECTED  Intervention: Administer and titrate oxygen therapy  Note: Administer O2 and monitor pulse ox for improvement. Titrate down when possible.     Problem: Skin Integrity  Goal: Risk for impaired skin integrity will decrease  Outcome: PROGRESSING AS EXPECTED  Intervention: Assess risk factors for impaired skin integrity and/or pressure ulcers  Note: Q2 turns and waffle cushion to reduce risk of skin breakdown.

## 2020-06-26 NOTE — ED TRIAGE NOTES
Pt to er via [facility] from memory care for reported + covid test today. [facility] also reports hx of dementia and non verbal, rm air sat88%, with improvement on 3L n/c where pt remains

## 2020-06-26 NOTE — ED NOTES
Results noted by erp-further orders recvd. For admit-erp spoke with son rafy over the phone to discuss plan of care

## 2020-06-26 NOTE — ED PROVIDER NOTES
ED Provider Note    CHIEF COMPLAINT  Chief Complaint   Patient presents with   • Other     from memory care with + covid   • Hypoxemia       HPI  Rozina Houser is a 89 y.o. female who presents after being diagnosed with COVID 19 from her memory care unit.  Patient is a DNR/DNI.  According to her son she has had 2 days of cough and fever.  Her memory care unit had a COVID 19 outbreak.  Patient denies any nausea or vomiting.  Positive fever and chills.  Positive cough.    REVIEW OF SYSTEMS  See HPI for further details.  Positive fever and chills.  Positive cough.  No vomiting or diarrhea.  All other systems are negative.    PAST MEDICAL HISTORY  Past Medical History:   Diagnosis Date   • Colon polyps    • Diabetes     pre diabetic   • GERD (gastroesophageal reflux disease)    • HTN    • Hyperglycemia    • Hyperlipidemia    • Macular degeneration    • PVD (peripheral vascular disease) (HCC)    • Skin cancer    • Stroke (HCC)        FAMILY HISTORY  Family History   Problem Relation Age of Onset   • Heart Disease Mother        SOCIAL HISTORY  Social History     Socioeconomic History   • Marital status:      Spouse name: Not on file   • Number of children: Not on file   • Years of education: Not on file   • Highest education level: Not on file   Occupational History   • Not on file   Social Needs   • Financial resource strain: Not on file   • Food insecurity     Worry: Not on file     Inability: Not on file   • Transportation needs     Medical: Not on file     Non-medical: Not on file   Tobacco Use   • Smoking status: Never Smoker   • Smokeless tobacco: Never Used   Substance and Sexual Activity   • Alcohol use: No   • Drug use: No   • Sexual activity: Not on file   Lifestyle   • Physical activity     Days per week: Not on file     Minutes per session: Not on file   • Stress: Not on file   Relationships   • Social connections     Talks on phone: Not on file     Gets together: Not on file     Attends  [employer] service: Not on file     Active member of club or organization: Not on file     Attends meetings of clubs or organizations: Not on file     Relationship status: Not on file   • Intimate partner violence     Fear of current or ex partner: Not on file     Emotionally abused: Not on file     Physically abused: Not on file     Forced sexual activity: Not on file   Other Topics Concern   • Not on file   Social History Narrative   • Not on file       SURGICAL HISTORY  Past Surgical History:   Procedure Laterality Date   • APPENDECTOMY     • ARTHROPLASTY     • CHOLECYSTECTOMY     • COLECTOMY     • OTHER ORTHOPEDIC SURGERY      left knee replacement   • TONSILLECTOMY         CURRENT MEDICATIONS  Home Medications    **Home medications have not yet been reviewed for this encounter**         ALLERGIES  Allergies   Allergen Reactions   • Alendronate Sodium    • Fosamax    • Pravachol [Pravastatin Sodium]    • Premarin    • Sulfa Drugs        PHYSICAL EXAM  VITAL SIGNS: /78   Pulse 74   Temp 37.1 °C (98.7 °F) (Axillary)   Resp (!) 26   Wt 65.8 kg (145 lb)   SpO2 97%   BMI 24.13 kg/m²   Constitutional: Well developed, Well nourished,   HENT: Normocephalic, Atraumatic, Bilateral external ears normal, Oropharynx moist  Neck: Normal range of motion, No tenderness, Supple, No stridor. No nuchal rigidity  Lymphatic: No lymphadenopathy noted.   Cardiovascular: Regular rate and rhythm  Thorax & Lungs: Clear without wheezing  Abdomen: Bowel sounds normal, Soft, No tenderness, No masses,   Skin: Warm, Dry, No erythema, No rash.   Back: No tenderness, No CVA tenderness.   Extremities: No edema, , No clubbing. Dorsalis pedis pulses 2+ equal bilaterally. Radial pulses 2+ equal bilaterally.  Neurologic: Awake alert.  Moving all extremities      RADIOLOGY/PROCEDURES  DX-CHEST-PORTABLE (1 VIEW)   Final Result      Left basilar opacities, consistent with pneumonia.            COURSE & MEDICAL DECISION MAKING  Pertinent Labs  & Imaging studies reviewed. (See chart for details)  Patient is a DNR/DNI.  X-ray showed left lower lobe pneumonia.  Patient will be started on a antibiotics and admitted by Dr. Moser.      FINAL IMPRESSION  1.  COVID 19  2.  Pneumonia  3.      Please note that this dictation was created using voice recognition software. I have worked with consultants from the vendor as well as technical experts from [facility] to optimize the interface. I have made every reasonable attempt to correct obvious errors, but I expect that there are errors of grammar and possibly content that I did not discover before finalizing the note.      Electronically signed by: Jerrell Hills M.D., 6/25/2020 9:33 PM

## 2020-06-27 NOTE — CARE PLAN
Problem: Skin Integrity  Goal: Risk for impaired skin integrity will decrease  Outcome: PROGRESSING AS EXPECTED  Note: Performing q 2 turns, pillow under heels.      Problem: Communication  Goal: The ability to communicate needs accurately and effectively will improve  Outcome: PROGRESSING SLOWER THAN EXPECTED  Note: Pt unable to verbalize needs or answer questions. Will continue to monitor pt checking every hour.

## 2020-06-27 NOTE — PROGRESS NOTES
Report received from TOSIN Thompson. Pt denies needs at this time. Safety precautions in place. Call light within reach.

## 2020-06-27 NOTE — DISCHARGE PLANNING
Called pt's son, Denny to discuss discharge planning. Denny states pt lives at [facility]. Pt was on service with [facility] prior to admission. Plan is for pt to return there with continued hospice services. Denny provided administrators phone number- Adam Kinney [phone]    Called Adam and left  requesting a call back    Called [facility] and per tech they need admin (Adam's) approval for pt to return

## 2020-06-27 NOTE — DISCHARGE PLANNING
Agency/Facility Name: [facility]  Spoke To: Fauzia [phone]  Outcome: Will work on putting patient back into system. Would like to be notified when patient is discharging.

## 2020-06-27 NOTE — DISCHARGE PLANNING
Received Choice form at [phone]  Agency/Facility Name: [facility]   Referral sent per Choice form @ [phone]

## 2020-06-27 NOTE — PROGRESS NOTES
Hospital Medicine Daily Progress Note    Date of Service  6/27/2020    Chief Complaint  89 y.o. female admitted 6/25/2020 with hypoxia    Hospital Course    89 y.o. female who presented 6/25/2020 with positive COVID test and hypoxia.  Patient does have a history of dementia, is at a memory care unit.  Is currently nonverbal, I did read that that was her baseline.       Interval Problem Update  Seen and examined, patient yelling in pain, otherwise nonverbal  Discussed with son and his wife again this morning  Discussed with case management, looking into patient returning to memory care on isolation  Additional history unobtainable from patient    Consultants/Specialty  None    Code Status  DNR/DNI    Disposition  Continue inpatient, dissipate back to memory care    Review of Systems  Review of Systems   Unable to perform ROS: Mental acuity        Physical Exam  Temp:  [37.1 °C (98.8 °F)-38.1 °C (100.6 °F)] 37.1 °C (98.8 °F)  Pulse:  [83-96] 83  Resp:  [12-18] 12  BP: (130-148)/() 142/55  SpO2:  [93 %-96 %] 94 %    Physical Exam  Vitals signs and nursing note reviewed.   Constitutional:       General: She is in acute distress.      Appearance: She is ill-appearing.   HENT:      Head: Normocephalic and atraumatic.      Nose: No congestion.      Mouth/Throat:      Mouth: Mucous membranes are dry.      Pharynx: Oropharynx is clear.   Eyes:      General:         Right eye: No discharge.         Left eye: No discharge.      Extraocular Movements: Extraocular movements intact.      Conjunctiva/sclera: Conjunctivae normal.   Cardiovascular:      Rate and Rhythm: Normal rate.      Pulses: Normal pulses.   Pulmonary:      Effort: Pulmonary effort is normal. No respiratory distress.      Breath sounds: No wheezing.   Abdominal:      General: Bowel sounds are normal. There is no distension.      Palpations: Abdomen is soft.      Tenderness: There is no abdominal tenderness. There is no guarding.   Musculoskeletal:          General: No swelling or deformity.   Skin:     General: Skin is warm and dry.   Neurological:      Mental Status: She is alert. She is disoriented.      Motor: Weakness present.         Fluids    Intake/Output Summary (Last 24 hours) at 6/27/2020 1201  Last data filed at 6/26/2020 1500  Gross per 24 hour   Intake 120 ml   Output --   Net 120 ml       Laboratory  Recent Labs     06/25/20 1947 06/26/20 0457 06/27/20  0517   WBC 6.2 6.1 7.1   RBC 5.41* 5.00 5.61*   HEMOGLOBIN 16.2* 15.0 16.8*   HEMATOCRIT 48.8* 45.9 52.5*   MCV 90.2 91.8 93.6   MCH 29.9 30.0 29.9   MCHC 33.2* 32.7* 32.0*   RDW 45.1 46.2 45.7   PLATELETCT 243 213 175   MPV 9.9 9.9 9.5     Recent Labs     06/25/20 1947 06/26/20 0457 06/27/20  0517   SODIUM 139 136 138   POTASSIUM 3.9 3.9 4.2   CHLORIDE 101 103 102   CO2 23 23 22   GLUCOSE 155* 151* 106*   BUN 21 18 15   CREATININE 0.93 0.90 0.79   CALCIUM 9.0 8.4 8.8     Recent Labs     06/27/20  0855   APTT 37.6*   INR 0.93               Imaging  DX-CHEST-PORTABLE (1 VIEW)   Final Result      Left basilar opacities, consistent with pneumonia.           Assessment/Plan  * COVID-19 virus infection- (present on admission)  Assessment & Plan  -Test came back positive  -Keep patient isolated  -Causing acute respiratory failure  -Concerning that this is also associated with a pneumonia  -Patient does not have significant renal failure, at this point in time I think we can avoid IV fluids  -Not causing sepsis      Acute respiratory failure with hypoxia (HCC)- (present on admission)  Assessment & Plan  -Due to COVID-19 infection  -Patient was satting 88% on room air, improved with low flow oxygen  -I did personally review her chest x-ray, did note a left base opacity, concerning for pneumonia  Procalcitonin normal  No white count  Antibiotics have been discontinued      Hyperglycemia- (present on admission)  Assessment & Plan  -Is not on diabetic medications  -Start diabetic diet as well as insulin sliding  scale  -Adjust as needed    Dementia (HCC)- (present on admission)  Assessment & Plan  -Chronic, patient is DNR    HTN (hypertension), benign- (present on admission)  Assessment & Plan  -Continue home hydrochlorothiazide  -Start PRN hydralazine  -Adjust as needed       VTE prophylaxis: Lovenox    Total time: 40 minutes. I spent greater than 50% of the time for patient care, counseling, and coordination on this date, including unit/floor time, and face-to-face time with the patient as per interval events and assessment and plan above. Where indicated, the assessment and plan reflect discussion of patient with consultants, other healthcare providers, family members, and additional research needed to obtain further information in formulating the plan of care for Rozina Houser.

## 2020-06-27 NOTE — THERAPY
SPEECH THERAPY CONTACT NOTE: Spoke to RN regarding CSE. Unclear if it is indicated. RN to clarify with MD and page SLP.

## 2020-06-27 NOTE — CARE PLAN
Problem: Safety  Goal: Will remain free from injury  Outcome: PROGRESSING AS EXPECTED  Intervention: Provide assistance with mobility  Note: Pt not mobilized due to unstable condition.  Goal: Will remain free from falls  Outcome: PROGRESSING AS EXPECTED  Intervention: Assess risk factors for falls  Flowsheets (Taken 6/27/2020 0423)  Pt Calls for Assistance: No  Mobility Status Assessment: 2-2 Healthcare Providers Required for Assistance with Ambulation & Transfer  Intervention: Implement fall precautions  Flowsheets  Taken 6/27/2020 0423  Bed Alarm: Yes - Alarm On  Taken 6/26/2020 2232  Environmental Precautions:   Treaded Slipper Socks on Patient   Personal Belongings, Wastebasket, Call Bell etc. in Easy Reach   Report Given to Other Health Care Providers Regarding Fall Risk   Bed in Low Position   Communication Sign for Patients & Families   Mobility Assessed & Appropriate Sign Placed     Problem: Knowledge Deficit  Goal: Knowledge of disease process/condition, treatment plan, diagnostic tests, and medications will improve  Outcome: PROGRESSING SLOWER THAN EXPECTED  Note: Pt A&Ox1-to self. No learning evident.  Goal: Knowledge of the prescribed therapeutic regimen will improve  Outcome: PROGRESSING SLOWER THAN EXPECTED

## 2020-06-27 NOTE — PROGRESS NOTES
Pt A&Ox1-to self this shift. Pt mostly nonverbal but was able to answer some yes or no questions during assessment. Pt c/o pain in bilateral hands and bilateral lower legs.  Pt x1 assist to turn.  Pt tolerating 3L NC.  Pt had fever of 100.5F axillary at 0157.  Pt did not require insulin this shift.

## 2020-06-27 NOTE — PROGRESS NOTES
Hospital Medicine Daily Progress Note    Date of Service  6/27/2020    Chief Complaint  89 y.o. female admitted 6/25/2020 with hypoxia    Hospital Course    89 y.o. female who presented 6/25/2020 with positive COVID test and hypoxia.  Patient does have a history of dementia, is at a memory care unit.  Is currently nonverbal, I did read that that was her baseline.       Interval Problem Update  Seen and examined, patient yelling in pain, otherwise nonverbal  Discussed with son Denny at length, she is currently enrolled in hospice at [facility]  Additional history unobtainable from patient    Consultants/Specialty  None    Code Status  DNR/DNI    Disposition  Continue inpatient, dissipate back to memory care    Review of Systems  Review of Systems   Unable to perform ROS: Mental acuity        Physical Exam  Temp:  [37.1 °C (98.8 °F)-38.1 °C (100.6 °F)] 37.1 °C (98.8 °F)  Pulse:  [83-96] 83  Resp:  [12-18] 12  BP: (130-148)/() 142/55  SpO2:  [93 %-96 %] 94 %    Physical Exam  Vitals signs and nursing note reviewed.   Constitutional:       General: She is in acute distress.      Appearance: She is ill-appearing.   HENT:      Head: Normocephalic and atraumatic.      Nose: No congestion.      Mouth/Throat:      Mouth: Mucous membranes are dry.      Pharynx: Oropharynx is clear.   Eyes:      General:         Right eye: No discharge.         Left eye: No discharge.      Extraocular Movements: Extraocular movements intact.      Conjunctiva/sclera: Conjunctivae normal.   Cardiovascular:      Rate and Rhythm: Normal rate.      Pulses: Normal pulses.   Pulmonary:      Effort: Pulmonary effort is normal. No respiratory distress.      Breath sounds: No wheezing.   Abdominal:      General: Bowel sounds are normal. There is no distension.      Palpations: Abdomen is soft.      Tenderness: There is no abdominal tenderness. There is no guarding.   Musculoskeletal:         General: No swelling or deformity.   Skin:     General:  Skin is warm and dry.   Neurological:      Mental Status: She is alert. She is disoriented.      Motor: Weakness present.         Fluids    Intake/Output Summary (Last 24 hours) at 6/27/2020 1159  Last data filed at 6/26/2020 1500  Gross per 24 hour   Intake 120 ml   Output --   Net 120 ml       Laboratory  Recent Labs     06/25/20 1947 06/26/20 0457 06/27/20  0517   WBC 6.2 6.1 7.1   RBC 5.41* 5.00 5.61*   HEMOGLOBIN 16.2* 15.0 16.8*   HEMATOCRIT 48.8* 45.9 52.5*   MCV 90.2 91.8 93.6   MCH 29.9 30.0 29.9   MCHC 33.2* 32.7* 32.0*   RDW 45.1 46.2 45.7   PLATELETCT 243 213 175   MPV 9.9 9.9 9.5     Recent Labs     06/25/20 1947 06/26/20 0457 06/27/20  0517   SODIUM 139 136 138   POTASSIUM 3.9 3.9 4.2   CHLORIDE 101 103 102   CO2 23 23 22   GLUCOSE 155* 151* 106*   BUN 21 18 15   CREATININE 0.93 0.90 0.79   CALCIUM 9.0 8.4 8.8     Recent Labs     06/27/20  0855   APTT 37.6*   INR 0.93               Imaging  DX-CHEST-PORTABLE (1 VIEW)   Final Result      Left basilar opacities, consistent with pneumonia.           Assessment/Plan  * COVID-19 virus infection- (present on admission)  Assessment & Plan  -Test came back positive  -Keep patient isolated  -Causing acute respiratory failure  -Concerning that this is also associated with a pneumonia  -Patient does not have significant renal failure, at this point in time I think we can avoid IV fluids  -Not causing sepsis      Acute respiratory failure with hypoxia (HCC)- (present on admission)  Assessment & Plan  -Due to COVID-19 infection  -Patient was satting 88% on room air, improved with low flow oxygen  -I did personally review her chest x-ray, did note a left base opacity, concerning for pneumonia  Procalcitonin normal  No white count  Continue antibiotics for now but can discontinue these      Hyperglycemia- (present on admission)  Assessment & Plan  -Is not on diabetic medications  -Start diabetic diet as well as insulin sliding scale  -Adjust as needed    Dementia  (HCC)- (present on admission)  Assessment & Plan  -Chronic, patient is DNR    HTN (hypertension), benign- (present on admission)  Assessment & Plan  -Continue home hydrochlorothiazide  -Start PRN hydralazine  -Adjust as needed       VTE prophylaxis: Lovenox

## 2020-06-28 NOTE — PROGRESS NOTES
Received shift handoff report from Amanda LEAHY. Pt is in bed and stable on 3LNC. Bed alarm is on. Bed locked and in lowest position, upper side rails up, call light in reach, whiteboard updated. Pt not oriented to time, place, situation, or self. Pt is easily aroused.

## 2020-06-28 NOTE — PROGRESS NOTES
Took report from [place] RN, pt is resting in bed. Pt refused AM blood sugar check per NOC RN. Bed in low locked position, call light in reach, will continue to monitor.

## 2020-06-28 NOTE — PROGRESS NOTES
Hospital Medicine Daily Progress Note    Date of Service  6/28/2020    Chief Complaint  89 y.o. female admitted 6/25/2020 with hypoxia    Hospital Course    89 y.o. female who presented 6/25/2020 with positive COVID test and hypoxia.  Patient does have a history of dementia, is at a memory care unit.  Is currently nonverbal, I did read that that was her baseline.       Interval Problem Update  Seen and examined, patient yelling in pain, otherwise nonverbal  Discussed with son and his wife again this morning  Discussed with case management, looking into patient returning to memory care on isolation  Additional history unobtainable from patient    Consultants/Specialty  None    Code Status  DNR/DNI    Disposition  Continue inpatient, dissipate back to memory care    Review of Systems  Review of Systems   Unable to perform ROS: Mental acuity        Physical Exam  Temp:  [36.7 °C (98 °F)-37.1 °C (98.8 °F)] 36.7 °C (98.1 °F)  Pulse:  [79-97] 82  Resp:  [14-18] 18  BP: (118-151)/() 136/82  SpO2:  [94 %-97 %] 95 %    Physical Exam  Vitals signs and nursing note reviewed.   Constitutional:       General: She is in acute distress.      Appearance: She is ill-appearing.   HENT:      Head: Normocephalic and atraumatic.      Nose: No congestion.      Mouth/Throat:      Mouth: Mucous membranes are dry.      Pharynx: Oropharynx is clear.   Eyes:      General:         Right eye: No discharge.         Left eye: No discharge.      Extraocular Movements: Extraocular movements intact.      Conjunctiva/sclera: Conjunctivae normal.   Cardiovascular:      Rate and Rhythm: Normal rate.      Pulses: Normal pulses.   Pulmonary:      Effort: Pulmonary effort is normal. No respiratory distress.      Breath sounds: No wheezing.   Abdominal:      General: Bowel sounds are normal. There is no distension.      Palpations: Abdomen is soft.      Tenderness: There is no abdominal tenderness. There is no guarding.   Musculoskeletal:          General: No swelling or deformity.   Skin:     General: Skin is warm and dry.   Neurological:      Mental Status: She is alert. She is disoriented.      Motor: Weakness present.         Fluids  No intake or output data in the 24 hours ending 06/28/20 1424    Laboratory  Recent Labs     06/26/20 0457 06/27/20  0517 06/28/20  0412   WBC 6.1 7.1 6.9   RBC 5.00 5.61* 5.52*   HEMOGLOBIN 15.0 16.8* 16.6*   HEMATOCRIT 45.9 52.5* 50.0*   MCV 91.8 93.6 90.6   MCH 30.0 29.9 30.1   MCHC 32.7* 32.0* 33.2*   RDW 46.2 45.7 43.9   PLATELETCT 213 175 195   MPV 9.9 9.5 9.9     Recent Labs     06/26/20 0457 06/27/20  0517 06/28/20  0412   SODIUM 136 138 138   POTASSIUM 3.9 4.2 3.9   CHLORIDE 103 102 101   CO2 23 22 21   GLUCOSE 151* 106* 113*   BUN 18 15 18   CREATININE 0.90 0.79 0.91   CALCIUM 8.4 8.8 8.8     Recent Labs     06/27/20  0855   APTT 37.6*   INR 0.93               Imaging  DX-CHEST-PORTABLE (1 VIEW)   Final Result      Left basilar opacities, consistent with pneumonia.           Assessment/Plan  * COVID-19 virus infection- (present on admission)  Assessment & Plan  -Test came back positive  -Keep patient isolated  -Causing acute respiratory failure  -Concerning that this is also associated with a pneumonia  -Patient does not have significant renal failure, at this point in time I think we can avoid IV fluids  -Not causing sepsis      Acute respiratory failure with hypoxia (HCC)- (present on admission)  Assessment & Plan  -Due to COVID-19 infection  -Patient was satting 88% on room air, improved with low flow oxygen  -I did personally review her chest x-ray, did note a left base opacity, concerning for pneumonia  Procalcitonin normal  No white count  Antibiotics have been discontinued      Hyperglycemia- (present on admission)  Assessment & Plan  -Is not on diabetic medications  -Start diabetic diet as well as insulin sliding scale  -Adjust as needed    Dementia (HCC)- (present on admission)  Assessment &  Plan  -Chronic, patient is DNR    HTN (hypertension), benign- (present on admission)  Assessment & Plan  -Continue home hydrochlorothiazide  -Start PRN hydralazine  -Adjust as needed     VTE prophylaxis: Lovenox

## 2020-06-28 NOTE — CARE PLAN
Problem: Skin Integrity  Goal: Risk for impaired skin integrity will decrease  Outcome: PROGRESSING AS EXPECTED  Note: Performing q 2 turns. Checking pt and keeping pt dry with every turn     Problem: Communication  Goal: The ability to communicate needs accurately and effectively will improve  Outcome: PROGRESSING SLOWER THAN EXPECTED  Note: Pt unable to verbalize needs, checking pt every hour. Offering food and fluids. Pt refusing

## 2020-06-28 NOTE — CARE PLAN
Problem: Infection  Goal: Will remain free from infection  Outcome: PROGRESSING AS EXPECTED  Intervention: Assess signs and symptoms of infection  Note: Patient is COVID-19 positive. Isolation cart, PAPR, disposable gloves outside patient room. Gown facing wall on hook inside room. Performed hand hygiene before and after entering patient room. Patient mentation is confused and does not understanding infection interventions.     Problem: Venous Thromboembolism (VTW)/Deep Vein Thrombosis (DVT) Prevention:  Goal: Patient will participate in Venous Thrombosis (VTE)/Deep Vein Thrombosis (DVT)Prevention Measures  Outcome: PROGRESSING AS EXPECTED  Flowsheets (Taken 6/27/2020 2000)  Risk Assessment Score: 1  VTE RISK: Moderate  Mechanical Prophylaxis: SCDs, Sequential Compression Device  SCDs, Sequential Compression Device: Off  Pharmacologic Prophylaxis Used: LMWH: Enoxaparin(Lovenox)

## 2020-06-29 NOTE — DISCHARGE PLANNING
Care Transition Team Assessment    Information Source  Orientation : Unable to Assess  Who is responsible for making decisions for patient? : POA  Name(s) of Primary Decision Maker: (Mamadou Mik [phone])    Readmission Evaluation  Is this a readmission?: No    Elopement Risk  Legal Hold: No  Ambulatory or Self Mobile in Wheelchair: No-Not an Elopement Risk  Disoriented: Place-At Risk for Elopement, Time-At Risk for Elopement, Situation-At Risk for Elopement  Psychiatric Symptoms: None  History of Wandering: No  Elopement this Admit: No  Vocalizing Wanting to Leave: No  Displays Behaviors, Body Language Wanting to Leave: No-Not at Risk for Elopement  Elopement Risk: Not at Risk for Elopement         Discharge Preparedness  What is your plan after discharge?: Home with help, Other (comment)([facility])  What are your discharge supports?: Child, Other (comment)([facility], [facility])  Prior Functional Level: Needs Assist with Activities of Daily Living, Needs Assist with Medication Management    Functional Assesment  Prior Functional Level: Needs Assist with Activities of Daily Living, Needs Assist with Medication Management    Finances  Financial Barriers to Discharge: No  Prescription Coverage: Yes    Vision / Hearing Impairment  Right Eye Vision: Impaired, Wears Glasses  Left Eye Vision: Impaired, Wears Glasses         Advance Directive  Advance Directive?: DPOA for Health Care  Durable Power of  Name and Contact : (Mamadou Houser)         Psychological Assessment  History of Substance Abuse: None  History of Psychiatric Problems: No  Non-compliant with Treatment: No  Newly Diagnosed Illness: Yes    Discharge Risks or Barriers  Discharge risks or barriers?: Complex medical needs(Covid 19)  Patient risk factors: Cognitive / sensory / physical deficit, Vulnerable adult    Anticipated Discharge Information  Anticipated discharge disposition: Home, Hospice

## 2020-06-29 NOTE — DISCHARGE PLANNING
JOHNATHAN spoke with  employee at [facility].  Adam is not in at the moment. JOHNATHAN left message with  to call back.

## 2020-06-29 NOTE — PROGRESS NOTES
Hospital Medicine Daily Progress Note    Date of Service  6/29/2020    Chief Complaint  89 y.o. female admitted 6/25/2020 with hypoxia    Hospital Course    89 y.o. female who presented 6/25/2020 with positive COVID test and hypoxia.  Patient does have a history of dementia, is at a memory care unit.  Is currently nonverbal, I did read that that was her baseline.       Interval Problem Update  Seen and examined, patient yelling in pain, otherwise nonverbal  Discussed with son and his wife again this morning  Discussed with case management, looking into patient returning to memory care on isolation  Additional history unobtainable from patient    Consultants/Specialty  None    Code Status  DNR/DNI    Disposition  Continue inpatient, expect back to memory care    Review of Systems  Review of Systems   Unable to perform ROS: Mental acuity        Physical Exam  Temp:  [36.9 °C (98.4 °F)-37.3 °C (99.1 °F)] 37.1 °C (98.8 °F)  Pulse:  [82-86] 82  Resp:  [18] 18  BP: (105-128)/() 105/86  SpO2:  [93 %-94 %] 94 %    Physical Exam  Vitals signs and nursing note reviewed.   Constitutional:       General: She is in acute distress.      Appearance: She is ill-appearing.   HENT:      Head: Normocephalic and atraumatic.      Nose: No congestion.      Mouth/Throat:      Mouth: Mucous membranes are dry.      Pharynx: Oropharynx is clear.   Eyes:      General:         Right eye: No discharge.         Left eye: No discharge.      Extraocular Movements: Extraocular movements intact.      Conjunctiva/sclera: Conjunctivae normal.   Cardiovascular:      Rate and Rhythm: Normal rate.      Pulses: Normal pulses.   Pulmonary:      Effort: Pulmonary effort is normal. No respiratory distress.      Breath sounds: No wheezing.   Abdominal:      General: Bowel sounds are normal. There is no distension.      Palpations: Abdomen is soft.      Tenderness: There is no abdominal tenderness. There is no guarding.   Musculoskeletal:          General: No swelling or deformity.   Skin:     General: Skin is warm and dry.   Neurological:      Mental Status: She is alert. She is disoriented.      Motor: Weakness present.         Fluids    Intake/Output Summary (Last 24 hours) at 6/29/2020 1440  Last data filed at 6/28/2020 2000  Gross per 24 hour   Intake 0 ml   Output --   Net 0 ml       Laboratory  Recent Labs     06/27/20  0517 06/28/20 0412 06/29/20  0444   WBC 7.1 6.9 5.8   RBC 5.61* 5.52* 5.60*   HEMOGLOBIN 16.8* 16.6* 16.6*   HEMATOCRIT 52.5* 50.0* 50.2*   MCV 93.6 90.6 89.6   MCH 29.9 30.1 29.6   MCHC 32.0* 33.2* 33.1*   RDW 45.7 43.9 42.6   PLATELETCT 175 195 182   MPV 9.5 9.9 9.9     Recent Labs     06/27/20 0517 06/28/20 0412 06/29/20  0444   SODIUM 138 138 138   POTASSIUM 4.2 3.9 3.7   CHLORIDE 102 101 100   CO2 22 21 21   GLUCOSE 106* 113* 123*   BUN 15 18 21   CREATININE 0.79 0.91 0.80   CALCIUM 8.8 8.8 8.8     Recent Labs     06/27/20  0855   APTT 37.6*   INR 0.93               Imaging  DX-CHEST-PORTABLE (1 VIEW)   Final Result      Left basilar opacities, consistent with pneumonia.           Assessment/Plan  * COVID-19 virus infection- (present on admission)  Assessment & Plan  -Test came back positive  -Keep patient isolated  -Causing acute respiratory failure  -Concerning that this is also associated with a pneumonia  -Patient does not have significant renal failure, at this point in time I think we can avoid IV fluids  -Not causing sepsis      Acute respiratory failure with hypoxia (HCC)- (present on admission)  Assessment & Plan  -Due to COVID-19 infection  -Patient was satting 88% on room air, improved with low flow oxygen  -I did personally review her chest x-ray, did note a left base opacity, concerning for pneumonia  Procalcitonin normal  No white count  Antibiotics have been discontinued      Hyperglycemia- (present on admission)  Assessment & Plan  -Is not on diabetic medications  -Start diabetic diet as well as insulin sliding  scale  -Adjust as needed    Dementia (HCC)- (present on admission)  Assessment & Plan  -Chronic, patient is DNR    HTN (hypertension), benign- (present on admission)  Assessment & Plan  -Continue home hydrochlorothiazide  -Start PRN hydralazine  -Adjust as needed     VTE prophylaxis: Lovenox    Total time: 40 minutes. I spent greater than 50% of the time for patient care, counseling, and coordination on this date, including unit/floor time, and face-to-face time with the patient as per interval events and assessment and plan above. Where indicated, the assessment and plan reflect discussion of patient with consultants, other healthcare providers, family members, and additional research needed to obtain further information in formulating the plan of care for Rozina Houser.

## 2020-06-29 NOTE — PROGRESS NOTES
Pt is nonverbal.  Appears comfortable at this time.   VS WNL.  PIV patent, saline locked.   .5L O2 running via NC.  Bed alarm on.   Pt clean and dry. Repositioned.   POC discussed.  All needs met at this time.  Bed in low position.  Call light within reach.  Rounding in place.

## 2020-06-29 NOTE — DISCHARGE PLANNING
ARNALDOW left  for [facility] Admin Adam to call back regarding return to [facility].

## 2020-06-29 NOTE — CARE PLAN
Problem: Safety  Goal: Will remain free from injury  Outcome: PROGRESSING AS EXPECTED  Note: All precautions in place.     Problem: Skin Integrity  Goal: Risk for impaired skin integrity will decrease  Outcome: PROGRESSING AS EXPECTED  Note: All skin precautions in place. Will round frequently.

## 2020-06-29 NOTE — RESPIRATORY CARE
COPD EDUCATION by COPD CLINICAL EDUCATOR  6/29/2020 at 7:53 AM by Melissa Yarbrough RRT     Patient reviewed by COPD education team. Patient does not have a history or diagnosis of COPD and is a non-smoker, therefore does not qualify for the COPD program.

## 2020-06-30 PROBLEM — H91.90 HARD OF HEARING: Status: ACTIVE | Noted: 2020-01-01

## 2020-06-30 NOTE — CARE PLAN
Problem: Safety  Goal: Will remain free from injury  Outcome: PROGRESSING AS EXPECTED  Note: Bed in low locked position. Bed alarm is on.      Problem: Skin Integrity  Goal: Risk for impaired skin integrity will decrease  Outcome: PROGRESSING AS EXPECTED  Note: Patient is repositioned every 2 hours. Skin is kept clean and dry. Barrier cream in use.

## 2020-06-30 NOTE — PROGRESS NOTES
Received report from TOSIN Lyle. Patient is sleeping easily woken, Pt resting comfortably in bed, plan of care discussed with patient,Patient is showing no sighs of pain or distress, bed alarm is on, Call light within reach and safety precautions in place.

## 2020-06-30 NOTE — PROGRESS NOTES
Received report from TOSIN Mejia. Patient resting in bed with eyes closed. Patient appears to be comfortable. All safety precautions in place. Call bell within reach. Will continue to monitor.

## 2020-06-30 NOTE — DISCHARGE PLANNING
JOHNATHAN spoke with Selvin at [facility]. Selvin stated that admin Adam is on the phone and will try to call LSW after.

## 2020-06-30 NOTE — ASSESSMENT & PLAN NOTE
Does not respond to written words despite wearing her glasses, doesn't respond to voice..  Does track with her eyes however.

## 2020-06-30 NOTE — CARE PLAN
Problem: Safety  Goal: Will remain free from injury  Outcome: PROGRESSING AS EXPECTED   Educated pt on safety precautions, Pt is wearing non slip socks, bed is in the low locked position and the call light is within reach. Bed alarm is on   Problem: Skin Integrity  Goal: Risk for impaired skin integrity will decrease  Outcome: PROGRESSING AS EXPECTED   Providing Q2 turns and repositioning of bilateral lower extremities. Changing depends immediately when wet, ensuring patient is staying hydrated.

## 2020-06-30 NOTE — PROGRESS NOTES
2000- Assessment complete. Patient is non verbal, moans at times. Patient requiring 1L O2 at this time. Patient cleaned and repositioned to left side. Safety precautions in place. Call bell within reach. Will continue to monitor.   2200 - Patient resting in bed. Repositioned. Fluids offered, patient declined.   0130- Patient repositioned. Fluids offered, patient declined.   0300 - Patient repositioned. All safety precautions in place. Call bell within reach.  0550 - Due medications administered as ordered. Patient cleaned, barrier cream applied. Patient repositioned. All safety precautions in place. Call bell within reach.  0650 - Report given to TOSIN Epperson. Patient resting in bed with eyes closed. Respirations are even and unlabored. Care released.

## 2020-06-30 NOTE — PROGRESS NOTES
Rounded on patient. Reposition patient in bed attempted to feed patient lunch, patient refused. Offered several drinks of water. Call light is in reach, bed alarm is on, safety percautions in use.

## 2020-06-30 NOTE — PROGRESS NOTES
Reposition patient, fed patient a vanilla pudding cup, patient ate 100% offered patient water. Patient shows no signs of pain at this time. Respirations are even and unlabored.  Bed is low and alarm is on.

## 2020-06-30 NOTE — PROGRESS NOTES
Hospital Medicine Daily Progress Note    Date of Service  6/30/2020    Chief Complaint  89 y.o. female admitted 6/25/2020 with hypoxia.    Hospital Course    Patient residing at a memory care unit where a covid outbreak is present.  She has severe dementia and is nonverbal at baseline.  Initially there was concern of a secondary pneumonia but procalcitonin and white count are normal so antibiotics discontinued.        Interval Problem Update  Patient remains non-verbal.  Attempt to communicate with written word but no response from patient other than the appearance she was trying to read the paper.  She is currently on 1L nasal cannula.  Will continue weekly test, resulted as outpatient on 6/25 so next test would be 7/2/2020.      Consultants/Specialty  none    Code Status  DNR    Disposition  Memory care when COVID negative.    Review of Systems  Review of Systems   Unable to perform ROS: Dementia        Physical Exam  Temp:  [36.7 °C (98.1 °F)-36.8 °C (98.2 °F)] 36.8 °C (98.2 °F)  Pulse:  [82-90] 82  Resp:  [18-20] 20  BP: (103-141)/(60-79) 139/75  SpO2:  [90 %-94 %] 93 %    Physical Exam  Vitals signs and nursing note reviewed.   Constitutional:       General: She is not in acute distress.     Appearance: Normal appearance. She is not ill-appearing.   HENT:      Head: Normocephalic and atraumatic.      Nose: Nose normal.   Neck:      Musculoskeletal: Neck supple.   Cardiovascular:      Rate and Rhythm: Normal rate and regular rhythm.      Heart sounds: Normal heart sounds. No murmur.   Pulmonary:      Effort: Pulmonary effort is normal.      Breath sounds: Normal breath sounds.   Abdominal:      General: Bowel sounds are normal. There is no distension.      Palpations: Abdomen is soft.   Musculoskeletal:         General: No swelling or tenderness.   Skin:     General: Skin is warm and dry.   Neurological:      General: No focal deficit present.      Mental Status: She is alert.   Psychiatric:         Mood and  Affect: Mood normal.         Behavior: Behavior normal.         Fluids    Intake/Output Summary (Last 24 hours) at 6/30/2020 1417  Last data filed at 6/30/2020 1200  Gross per 24 hour   Intake 30 ml   Output --   Net 30 ml       Laboratory  Recent Labs     06/28/20 0412 06/29/20  0444 06/30/20  0504   WBC 6.9 5.8 6.1   RBC 5.52* 5.60* 5.71*   HEMOGLOBIN 16.6* 16.6* 17.0*   HEMATOCRIT 50.0* 50.2* 51.4*   MCV 90.6 89.6 90.0   MCH 30.1 29.6 29.8   MCHC 33.2* 33.1* 33.1*   RDW 43.9 42.6 43.6   PLATELETCT 195 182 193   MPV 9.9 9.9 9.9     Recent Labs     06/28/20 0412 06/29/20  0444 06/30/20  0504   SODIUM 138 138 133*   POTASSIUM 3.9 3.7 3.7   CHLORIDE 101 100 97   CO2 21 21 20   GLUCOSE 113* 123* 128*   BUN 18 21 22   CREATININE 0.91 0.80 0.81   CALCIUM 8.8 8.8 8.7                   Imaging  DX-CHEST-PORTABLE (1 VIEW)   Final Result      Left basilar opacities, consistent with pneumonia.           Assessment/Plan  * COVID-19 virus infection- (present on admission)  Assessment & Plan  -Test came back positive  -Keep patient isolated  -Causing acute respiratory failure  -Patient does not have significant renal failure, at this point in time I think we can avoid IV fluids  -Not causing sepsis      Acute respiratory failure with hypoxia (HCC)- (present on admission)  Assessment & Plan  -Due to COVID-19 infection  -Patient was satting 88% on room air, improved with low flow oxygen  Procalcitonin normal  No white count  Antibiotics have been discontinued      Hard of hearing  Assessment & Plan  Does not respond to written words despite wearing her glasses, doesn't respond to voice..      Hyperglycemia- (present on admission)  Assessment & Plan  -Is not on diabetic medications  -Start diabetic diet as well as insulin sliding scale  -Adjust as needed    Dementia (HCC)- (present on admission)  Assessment & Plan  -Chronic, patient is DNR    HTN (hypertension), benign- (present on admission)  Assessment & Plan  -Continue home  hydrochlorothiazide  -Start PRN hydralazine  -Adjust as needed       VTE prophylaxis: lovenox

## 2020-07-01 NOTE — PROGRESS NOTES
Changed patient, gave evening medications. Patient is non-verbal, patient ate 1 1/2 vanilla pudding cups with medications. Safety precautions in place, bed locked and bed alarm on.

## 2020-07-01 NOTE — PROGRESS NOTES
Patient's son (Denny) called for up date, explained patient is not eating well and that supplements will start today.

## 2020-07-01 NOTE — PROGRESS NOTES
Hospital Medicine Daily Progress Note    Date of Service  7/1/2020    Chief Complaint  89 y.o. female admitted 6/25/2020 with hypoxia.    Hospital Course    Patient residing at a memory care unit where a covid outbreak is present.  She has severe dementia and is nonverbal at baseline.  Initially there was concern of a secondary pneumonia but procalcitonin and white count are normal so antibiotics discontinued.        Interval Problem Update  6/30 Patient remains non-verbal.  Attempt to communicate with written word but no response from patient other than the appearance she was trying to read the paper.  She is currently on 1L nasal cannula.  Will continue weekly test, resulted as outpatient on 6/25 so next test would be 7/2/2020.    7/1 Patient doing about the same today.  Still requiring 1L to maintain saturations.  Per RN, she is not eating much, I will reach out to patient's son regarding interventions to help with appetite stimulant vs supportive care only.    Consultants/Specialty  none    Code Status  DNR    Disposition  Memory care when COVID negative.    Review of Systems  Review of Systems   Unable to perform ROS: Dementia        Physical Exam  Temp:  [36.6 °C (97.8 °F)-37.3 °C (99.1 °F)] 37.3 °C (99.1 °F)  Pulse:  [] 102  Resp:  [20-22] 20  BP: (125-136)/(47-68) 126/49  SpO2:  [91 %-96 %] 93 %    Physical Exam  Vitals signs and nursing note reviewed.   Constitutional:       General: She is not in acute distress.     Appearance: Normal appearance. She is not ill-appearing.   HENT:      Head: Normocephalic and atraumatic.      Nose: Nose normal.   Neck:      Musculoskeletal: Neck supple.   Cardiovascular:      Rate and Rhythm: Normal rate and regular rhythm.      Heart sounds: Normal heart sounds. No murmur.   Pulmonary:      Effort: Pulmonary effort is normal.      Breath sounds: Normal breath sounds.   Abdominal:      General: Bowel sounds are normal. There is no distension.      Palpations: Abdomen  is soft.   Musculoskeletal:         General: No swelling or tenderness.   Skin:     General: Skin is warm and dry.   Neurological:      General: No focal deficit present.      Mental Status: She is alert.   Psychiatric:         Mood and Affect: Mood normal.         Behavior: Behavior normal.         Fluids    Intake/Output Summary (Last 24 hours) at 7/1/2020 1158  Last data filed at 7/1/2020 0900  Gross per 24 hour   Intake 75 ml   Output --   Net 75 ml       Laboratory  Recent Labs     06/29/20  0444 06/30/20  0504 07/01/20  0457   WBC 5.8 6.1 5.1   RBC 5.60* 5.71* 5.76*   HEMOGLOBIN 16.6* 17.0* 17.2*   HEMATOCRIT 50.2* 51.4* 49.8*   MCV 89.6 90.0 86.5   MCH 29.6 29.8 29.9   MCHC 33.1* 33.1* 34.5   RDW 42.6 43.6 40.7   PLATELETCT 182 193 207   MPV 9.9 9.9 10.1     Recent Labs     06/29/20 0444 06/30/20  0504 07/01/20 0457   SODIUM 138 133* 139   POTASSIUM 3.7 3.7 3.3*   CHLORIDE 100 97 101   CO2 21 20 23   GLUCOSE 123* 128* 141*   BUN 21 22 25*   CREATININE 0.80 0.81 0.87   CALCIUM 8.8 8.7 9.0                   Imaging  DX-CHEST-PORTABLE (1 VIEW)   Final Result      Left basilar opacities, consistent with pneumonia.           Assessment/Plan  * COVID-19 virus infection- (present on admission)  Assessment & Plan  -Test came back positive  -Keep patient isolated  -Causing acute respiratory failure  -Patient does not have significant renal failure, at this point in time I think we can avoid IV fluids  -Not causing sepsis      Acute respiratory failure with hypoxia (HCC)- (present on admission)  Assessment & Plan  -Due to COVID-19 infection  -Patient was satting 88% on room air, improved with low flow oxygen  Procalcitonin normal  No white count  Antibiotics have been discontinued      Hard of hearing  Assessment & Plan  Does not respond to written words despite wearing her glasses, doesn't respond to voice..      Hyperglycemia- (present on admission)  Assessment & Plan  -Is not on diabetic medications  -Start  diabetic diet as well as insulin sliding scale  -Adjust as needed    Dementia (HCC)- (present on admission)  Assessment & Plan  -Chronic, patient is DNR    HTN (hypertension), benign- (present on admission)  Assessment & Plan  -Continue home hydrochlorothiazide  -Start PRN hydralazine  -Adjust as needed       VTE prophylaxis: lovenox

## 2020-07-01 NOTE — DISCHARGE PLANNING
ARNALDOW attempted to call  Adam to discuss pts d/c. No answer. ARNALDOW called [facility] and spoke with Selvin again who stated he would again pass message onto Adam once he comes into the office.

## 2020-07-01 NOTE — CARE PLAN
Problem: Communication  Goal: The ability to communicate needs accurately and effectively will improve  Outcome: PROGRESSING AS EXPECTED  Note: Nonverbal, baseline     Problem: Safety  Goal: Will remain free from injury  Outcome: PROGRESSING AS EXPECTED  Intervention: Provide assistance with mobility  Note: Bed bound  Goal: Will remain free from falls  Outcome: PROGRESSING AS EXPECTED  Intervention: Assess risk factors for falls  Flowsheets (Taken 6/30/2020 2225)  Pt Calls for Assistance: No  History of fall: 2  Intervention: Implement fall precautions  Flowsheets  Taken 6/30/2020 2225  Bed Alarm: Yes - Alarm On  Taken 6/30/2020 2016  Environmental Precautions:   Treaded Slipper Socks on Patient   Personal Belongings, Wastebasket, Call Bell etc. in Easy Reach   Report Given to Other Health Care Providers Regarding Fall Risk   Bed in Low Position   Communication Sign for Patients & Families   Mobility Assessed & Appropriate Sign Placed     Problem: Infection  Goal: Will remain free from infection  Outcome: PROGRESSING AS EXPECTED     Problem: Venous Thromboembolism (VTW)/Deep Vein Thrombosis (DVT) Prevention:  Goal: Patient will participate in Venous Thrombosis (VTE)/Deep Vein Thrombosis (DVT)Prevention Measures  Outcome: PROGRESSING AS EXPECTED  Flowsheets (Taken 6/30/2020 2016)  Mechanical Prophylaxis: SCDs, Sequential Compression Device  SCDs, Sequential Compression Device: Off  Pharmacologic Prophylaxis Used: LMWH: Enoxaparin(Lovenox)  Note: No s/s DVT     Problem: Bowel/Gastric:  Goal: Normal bowel function is maintained or improved  Outcome: PROGRESSING AS EXPECTED  Goal: Will not experience complications related to bowel motility  Outcome: PROGRESSING AS EXPECTED     Problem: Respiratory:  Goal: Respiratory status will improve  Outcome: PROGRESSING AS EXPECTED  Note: Lung sounds clear, diminished bases bilaterally, remains on 1L/min O2 via NC     Problem: Urinary Elimination:  Goal: Ability to reestablish a  normal urinary elimination pattern will improve  Outcome: PROGRESSING AS EXPECTED  Flowsheets (Taken 6/30/2020 2016)  Urinary Elimination: Incontinence  Note: Incontinent, baseline

## 2020-07-01 NOTE — DISCHARGE PLANNING
ARNALDOW escalated case to RN CM supervisor Fauzia.    ARNALDOW spoke with son Denny to see if he would be able to reach out to [facility] as well. Denny agreed and will call back.

## 2020-07-01 NOTE — CARE PLAN
Problem: Safety  Goal: Will remain free from injury  Outcome: PROGRESSING AS EXPECTED   Educated pt on safety precautions, Pt is wearing non slip socks, bed is in the low locked position and the call light is within reach, bed alarm on.  Problem: Respiratory:  Goal: Respiratory status will improve  Outcome: PROGRESSING AS EXPECTED   Patient on 1L O2 and above >90%

## 2020-07-01 NOTE — PROGRESS NOTES
CNA reported patient's temp at 99.1, RN re-checked 99.9. Gave tylenol. Will continue to monitor.  Call light in reach, bed locked and alarm is on.

## 2020-07-01 NOTE — PROGRESS NOTES
Rounded on patient, patient sleeping but easily awoken. Offered patient a Boost and reposition patient in bed.

## 2020-07-01 NOTE — DIETARY
Nutrition services: Day 6 of admit.  Rozina Houser is a 89 y.o. female with admitting DX of COVID-19 virus infection, Acute respiratory failure with hypoxia. DX includes hyperglycemia, dementia and HTN.    Consult received for poor PO      Assessment:  Weight: 66.5 kg (146 lb 9.7 oz)  Body mass index is 24.4 kg/m²., BMI classification: normal  Diet/Intake: diabetic/0 to < 25% of meals    Evaluation:   1. Pt's PO intake reviewed due to dx of Covid 19 placing pt at higher risk for malnutrition. Current PO intake is poor. Pt with dx of dementia and appears to be non-verbal. Due to current Covid 19 policy, RD is not able to visit pt in her room. It appears that phone call also would not be possible. Boost Plus will be added to meals TID to hopefully improve PO intake.   2. Labs: 7/1/20: potassium=3.3 (L), glucose=141 (H), BUN=25 (H)   3. Meds: hydrochlorthiazide, KCl    Malnutrition Risk: Criteria not met but risk noted due to poor PO intake, presence of infection and advanced age.     Recommendations/Plan:  1. Add Boost Glucose Control to meals TID   2. Encourage intake of >50%  3. Document intake of all meals and supplements as % taken in ADL's to provide interdisciplinary communication across all shifts.   4. Monitor weight.  5. Nutrition rep will continue to see patient for ongoing meal and snack preferences.     RD will monitor.

## 2020-07-01 NOTE — PROGRESS NOTES
Received report from TOSIN Hsu. Patient is awake, Pt resting comfortably in bed, plan of care discussed with patient, declines any needs at this time and denies pain. Call light within reach and safety precautions in place.

## 2020-07-02 PROBLEM — R63.0 POOR APPETITE: Status: ACTIVE | Noted: 2020-01-01

## 2020-07-02 NOTE — PROGRESS NOTES
Rounded on patient, changed patient's depends. Fed patient a pudding and a sorbet, gave sips of water. Patient tolerated well.

## 2020-07-02 NOTE — DISCHARGE PLANNING
MUNA researched owners of [employer], owned by [facility] [phone]. MUNA called, no response from anyone, no one awnsered.     Called Adam cell phone number: [phone], no answer, no ability to leave message.

## 2020-07-02 NOTE — PROGRESS NOTES
1845: Report received by Zeenat LEAHY. Plan of care discussed. Patient sitting comfortably in bed. Safety measures in place. Patient on airborne/droplet isolation. Patient still non-verbal. Attempts made for communication through writing and reading, though unsuccessful. Patient able to take small bites of pudding, or small sips of water/boost.     2100: Assessment complete. No signs of distress via non-verbal descriptors.

## 2020-07-02 NOTE — CARE PLAN
Problem: Safety  Goal: Will remain free from injury  Outcome: PROGRESSING AS EXPECTED  Intervention: Collaborate with Interdisciplinary Team for safe transfer and mobilization techniques  Note: Will use two assist for all transfers and turns. Patient non-ambulatory.     Problem: Respiratory:  Goal: Respiratory status will improve  Outcome: PROGRESSING AS EXPECTED  Intervention: Assess and monitor pulmonary status  Flowsheets  Taken 7/2/2020 0100 by Dariela Jose R.N.  Resp: 18  SpO2: 93 %  O2 (LPM): 1  Taken 7/1/2020 2100 by Brian Jennings RCHINMAY  RUL Breath Sounds: Clear  RML Breath Sounds: Diminished  RLL Breath Sounds: Diminished  CHET Breath Sounds: Clear  LLL Breath Sounds: Diminished  Note: Monitor patients respiratory status for improvement.

## 2020-07-02 NOTE — PROGRESS NOTES
Hospital Medicine Daily Progress Note    Date of Service  7/2/2020    Chief Complaint  89 y.o. female admitted 6/25/2020 with hypoxia.    Hospital Course    Patient residing at a memory care unit where a covid outbreak is present.  She has severe dementia and is nonverbal at baseline.  Initially there was concern of a secondary pneumonia but procalcitonin and white count are normal so antibiotics discontinued.        Interval Problem Update  6/30 Patient remains non-verbal.  Attempt to communicate with written word but no response from patient other than the appearance she was trying to read the paper.  She is currently on 1L nasal cannula.  Will continue weekly test, resulted as outpatient on 6/25 so next test would be 7/2/2020.    7/1 Patient doing about the same today.  Still requiring 1L to maintain saturations.  Per RN, she is not eating much, I will reach out to patient's son regarding interventions to help with appetite stimulant vs supportive care only.  7/2 Patient remained sleeping through my examination.  Discussed with son regarding poor appetite.  Will hold off on marinol at this time given patient's advanced age and dementia.  She continues on 1 L oxygen and hasn't had significant changes in the past 48 hours.    Consultants/Specialty  none    Code Status  DNR    Disposition  Memory care when COVID negative.    Review of Systems  Review of Systems   Unable to perform ROS: Dementia        Physical Exam  Temp:  [36.7 °C (98 °F)-38.4 °C (101.1 °F)] 36.8 °C (98.2 °F)  Pulse:  [75-86] 75  Resp:  [18-20] 18  BP: ()/(47-83) 132/52  SpO2:  [92 %-94 %] 94 %    Physical Exam  Vitals signs and nursing note reviewed.   Constitutional:       General: She is not in acute distress.     Appearance: Normal appearance. She is not ill-appearing.   HENT:      Head: Normocephalic and atraumatic.      Nose: Nose normal.   Neck:      Musculoskeletal: Neck supple.   Cardiovascular:      Rate and Rhythm: Normal rate and  regular rhythm.      Heart sounds: Normal heart sounds. No murmur.   Pulmonary:      Effort: Pulmonary effort is normal.      Breath sounds: Normal breath sounds.   Abdominal:      General: Bowel sounds are normal. There is no distension.      Palpations: Abdomen is soft.   Musculoskeletal:         General: No swelling or tenderness.   Skin:     General: Skin is warm and dry.   Neurological:      Mental Status: She is alert.   Psychiatric:         Mood and Affect: Mood normal.         Behavior: Behavior normal.         Fluids    Intake/Output Summary (Last 24 hours) at 7/2/2020 1156  Last data filed at 7/2/2020 0821  Gross per 24 hour   Intake 15 ml   Output --   Net 15 ml       Laboratory  Recent Labs     06/30/20  0504 07/01/20  0457 07/02/20  0338   WBC 6.1 5.1 4.6*   RBC 5.71* 5.76* 5.84*   HEMOGLOBIN 17.0* 17.2* 17.4*   HEMATOCRIT 51.4* 49.8* 53.9*   MCV 90.0 86.5 92.3   MCH 29.8 29.9 29.8   MCHC 33.1* 34.5 32.3*   RDW 43.6 40.7 45.4   PLATELETCT 193 207 163*   MPV 9.9 10.1 10.2     Recent Labs     06/30/20  0504 07/01/20  0457 07/02/20  0338   SODIUM 133* 139 136   POTASSIUM 3.7 3.3* 4.2   CHLORIDE 97 101 103   CO2 20 23 18*   GLUCOSE 128* 141* 142*   BUN 22 25* 27*   CREATININE 0.81 0.87 0.88   CALCIUM 8.7 9.0 8.8                   Imaging  DX-CHEST-PORTABLE (1 VIEW)   Final Result      Left basilar opacities, consistent with pneumonia.           Assessment/Plan  * COVID-19 virus infection- (present on admission)  Assessment & Plan  -Test came back positive  -Keep patient isolated  -Causing acute respiratory failure  -Patient does not have significant renal failure, at this point in time I think we can avoid IV fluids  -Not causing sepsis  - Weekly COVID test sent 7/2 - await results        Acute respiratory failure with hypoxia (HCC)- (present on admission)  Assessment & Plan  -Due to COVID-19 infection  -Patient was satting 88% on room air, improved with low flow oxygen  Procalcitonin normal  No white  count  Antibiotics have been discontinued      Poor appetite  Assessment & Plan  Discussed with son, hold off on marinol for now, okay for supplements with protein shakes and he re-iterated the patient does have a sweet tooth and this usually helps get calories in her.      Hard of hearing  Assessment & Plan  Does not respond to written words despite wearing her glasses, doesn't respond to voice..      Hyperglycemia- (present on admission)  Assessment & Plan  -Is not on diabetic medications  -Start diabetic diet as well as insulin sliding scale  -Adjust as needed    Dementia (HCC)- (present on admission)  Assessment & Plan  -Chronic, patient is DNR    HTN (hypertension), benign- (present on admission)  Assessment & Plan  -Continue home hydrochlorothiazide  -Start PRN hydralazine  -Adjust as needed       VTE prophylaxis: lovenox

## 2020-07-02 NOTE — DISCHARGE PLANNING
"Called [facility] [phone] and spoke with Selvin. Notified Selvin that pt is cleared to return home today and asked what they need from us prior to sending her back. Selvin stated that he was under the impression the family \"wanted her to stay in the hospital as long as possible\". Discussed that this is not the case and pt doesn't require acute care at this time and has been medically cleared. Selvin placed SW on hold and spoke with admin Adam, Selvin then stated they're having staffing issues and are in a \"crisis\". He states Adam will be calling SW back in the next 15-20 min.   "

## 2020-07-02 NOTE — CARE PLAN
Problem: Safety  Goal: Will remain free from falls  Outcome: PROGRESSING AS EXPECTED  Intervention: Implement fall precautions  7/2/2020 1532 by GAIL Sutherland (Taken 7/1/2020 2100 by Brian Jennings R.N.)  Environmental Precautions:   Personal Belongings, Wastebasket, Call Bell etc. in Easy Reach   Transferred to Stronger Side   Report Given to Other Health Care Providers Regarding Fall Risk   Bed in Low Position   Communication Sign for Patients & Families   Mobility Assessed & Appropriate Sign Placed    Problem: Respiratory:  Goal: Respiratory status will improve  Outcome: PROGRESSING AS EXPECTED     Problem: Skin Integrity  Goal: Risk for impaired skin integrity will decrease  Outcome: PROGRESSING AS EXPECTED  Intervention: Implement precautions to protect skin integrity in collaboration with the interdisciplinary team  7/2/2020 1532 by GAIL Sutherland (Taken 7/1/2020 2100 by Brian Jennings, R.N.)  Skin Preventative Measures:   Waffle Cushion   Heel Float Boots in Use   Silicone Oxygen Tubing in Use

## 2020-07-02 NOTE — DISCHARGE PLANNING
Called pt's son, eDnny and notified him of discussion this morning with Kike Elise. Adam never called back, as was indicated by staff. Denny states he still hasn't been able to reach anyone there. Suggested he visit facility in person to speak with someone. Denny states he will try calling again today.

## 2020-07-03 NOTE — DIETARY
Nutrition Services: Update   Day 8 of admit.  Rozina Houser is a 89 y.o. female with admitting DX of COVID-19 virus infection  Acute respiratory failure with hypoxia (HCC)    Pt is currently on diabetic diet with Boost Glucose Control TID with meals. PO intake continues to be < 25% of her meals. RD discussed poor PO with nurse. Nurse requested that pt receive softer foods. MD also agreed to liberalization of diabetic restriction. Per pt's son, pt likes sweets. She may not like the flavor of artificial sweeteners in the Boost Glucose Control and in other foods such as pudding. Diet changed to regular, level 7-easy to chew with thin liquids. Request also made from dietary to cut up pt's meats.      No new weight since admit. Wt was 66.5 kg on 6/27/20. Pt did not have a current ht in the computer. Ht taken from copy of ID card under Media tab (11/2/19). Ht is 157.5 cm. Pt's BMI is 26.81 indicating she is at a healthy wt for her age.     Labs: 7/3/20: glucose=160.     Recommendations/Plan:  1. Diet changed to regular, level 7-easy to chew with thin liquids.  2. Boost Glucose Control changed to Boost Plus TID.  3. Request for chopped meats (dietary)  4. Encourage intake of meals  5. Document intake of all meals as % taken in ADL's to provide interdisciplinary communication across all shifts.   6. Monitor weight.  7. Nutrition rep will continue to see patient for ongoing meal and snack preferences.    RD following

## 2020-07-03 NOTE — PROGRESS NOTES
Report received from TOSIN Torres and TOSIN Christine. Plan of care discussed. Patient resting comfortably in bed. Safety precautions in place.

## 2020-07-03 NOTE — PROGRESS NOTES
1845: Report received by Ina LEAHY. Plan of care discussed. Patient laying comfortably in bed. Safety measures in place.     2000: Assessment complete. Patient had temp of 101F. See MAR for intervention.

## 2020-07-03 NOTE — PROGRESS NOTES
Hospital Medicine Daily Progress Note    Date of Service  7/3/2020    Chief Complaint  89 y.o. female admitted 6/25/2020 with hypoxia.    Hospital Course    Patient residing at a memory care unit where a covid outbreak is present.  She has severe dementia and is nonverbal at baseline.  Initially there was concern of a secondary pneumonia but procalcitonin and white count are normal so antibiotics discontinued.        Interval Problem Update  6/30 Patient remains non-verbal.  Attempt to communicate with written word but no response from patient other than the appearance she was trying to read the paper.  She is currently on 1L nasal cannula.  Will continue weekly test, resulted as outpatient on 6/25 so next test would be 7/2/2020.    7/1 Patient doing about the same today.  Still requiring 1L to maintain saturations.  Per RN, she is not eating much, I will reach out to patient's son regarding interventions to help with appetite stimulant vs supportive care only.  7/2 Patient remained sleeping through my examination.  Discussed with son regarding poor appetite.  Will hold off on marinol at this time given patient's advanced age and dementia.  She continues on 1 L oxygen and hasn't had significant changes in the past 48 hours.  7/3 Patient without significant change, remains non-communicative.  She is drinking some boost and taking her medications.  COVID results should be back today.  Febrile overnight to 101.    Consultants/Specialty  none    Code Status  DNR    Disposition  Memory care when COVID negative.    Review of Systems  Review of Systems   Unable to perform ROS: Dementia        Physical Exam  Temp:  [36.6 °C (97.8 °F)-38.3 °C (101 °F)] 37 °C (98.6 °F)  Pulse:  [78-87] 87  Resp:  [18-20] 18  BP: (109-143)/() 138/67  SpO2:  [90 %-94 %] 91 %    Physical Exam  Vitals signs and nursing note reviewed.   Constitutional:       General: She is not in acute distress.     Appearance: Normal appearance. She is  not ill-appearing.   HENT:      Head: Normocephalic and atraumatic.      Nose: Nose normal.   Neck:      Musculoskeletal: Neck supple.   Cardiovascular:      Rate and Rhythm: Normal rate and regular rhythm.      Heart sounds: Normal heart sounds. No murmur.   Pulmonary:      Effort: Pulmonary effort is normal.      Breath sounds: Normal breath sounds.   Abdominal:      General: Bowel sounds are normal. There is no distension.      Palpations: Abdomen is soft.   Musculoskeletal:         General: No swelling or tenderness.   Skin:     General: Skin is warm and dry.   Neurological:      Mental Status: She is alert.   Psychiatric:         Mood and Affect: Mood normal.         Behavior: Behavior normal.         Fluids    Intake/Output Summary (Last 24 hours) at 7/3/2020 1322  Last data filed at 7/3/2020 0800  Gross per 24 hour   Intake 20 ml   Output --   Net 20 ml       Laboratory  Recent Labs     07/01/20 0457 07/02/20  0338 07/03/20  0600   WBC 5.1 4.6* 5.4   RBC 5.76* 5.84* 5.94*   HEMOGLOBIN 17.2* 17.4* 17.9*   HEMATOCRIT 49.8* 53.9* 52.7*   MCV 86.5 92.3 88.7   MCH 29.9 29.8 30.1   MCHC 34.5 32.3* 34.0   RDW 40.7 45.4 43.6   PLATELETCT 207 163* 219   MPV 10.1 10.2 10.1     Recent Labs     07/01/20 0457 07/02/20  0338 07/03/20  0600   SODIUM 139 136 144   POTASSIUM 3.3* 4.2 3.8   CHLORIDE 101 103 106   CO2 23 18* 22   GLUCOSE 141* 142* 160*   BUN 25* 27* 28*   CREATININE 0.87 0.88 0.95   CALCIUM 9.0 8.8 8.9                   Imaging  DX-CHEST-PORTABLE (1 VIEW)   Final Result      Left basilar opacities, consistent with pneumonia.           Assessment/Plan  * COVID-19 virus infection- (present on admission)  Assessment & Plan  -Test came back positive  -Keep patient isolated  -Causing acute respiratory failure  -Patient does not have significant renal failure, at this point in time I think we can avoid IV fluids  -Not causing sepsis  - Weekly COVID test sent 7/2 - await results        Acute respiratory failure with  hypoxia (HCC)- (present on admission)  Assessment & Plan  -Due to COVID-19 infection  -Patient was satting 88% on room air, improved with low flow oxygen  Procalcitonin normal  No white count  Antibiotics have been discontinued      Poor appetite  Assessment & Plan  Discussed with son, hold off on marinol for now, okay for supplements with protein shakes and he re-iterated the patient does have a sweet tooth and this usually helps get calories in her.      Hard of hearing  Assessment & Plan  Does not respond to written words despite wearing her glasses, doesn't respond to voice..      Hyperglycemia- (present on admission)  Assessment & Plan  -Is not on diabetic medications  -Start diabetic diet as well as insulin sliding scale  -Adjust as needed    Dementia (HCC)- (present on admission)  Assessment & Plan  -Chronic, patient is DNR    HTN (hypertension), benign- (present on admission)  Assessment & Plan  -Continue home hydrochlorothiazide  -Start PRN hydralazine  -Adjust as needed       VTE prophylaxis: lovenox

## 2020-07-03 NOTE — CARE PLAN
Problem: Safety  Goal: Will remain free from injury  Outcome: PROGRESSING AS EXPECTED  Intervention: Collaborate with Interdisciplinary Team for safe transfer and mobilization techniques  Note: Max assist with all movements to prevent injury.      Problem: Skin Integrity  Goal: Risk for impaired skin integrity will decrease  Outcome: PROGRESSING AS EXPECTED  Note: Use moon boots to float heels, extra pillow for support and in between knees to prevent skin breakdown at bony prominences. Q2 turns.

## 2020-07-03 NOTE — CARE PLAN
Problem: Skin Integrity  Goal: Risk for impaired skin integrity will decrease  Outcome: PROGRESSING AS EXPECTED  Intervention: Implement precautions to protect skin integrity in collaboration with the interdisciplinary team  Note: Q 2 turns in place, waffle cushion, heel float boots, pillows in place for repositioning.

## 2020-07-03 NOTE — PROGRESS NOTES
Report received from TOSIN Courtney. Pt denies needs at this time. Safety precautions in place. Call light within reach.

## 2020-07-04 NOTE — PROGRESS NOTES
1845: Report received by Lux LEAHY. Plan of care discussed. Patient sitting comfortably in bed. Safety measures in place.    2141: Assessment complete. Water and boost offered, patient refused boost and accepted small sip of water.

## 2020-07-04 NOTE — PROGRESS NOTES
Hospital Medicine Daily Progress Note    Date of Service  7/4/2020    Chief Complaint  89 y.o. female admitted 6/25/2020 with hypoxia.    Hospital Course    Patient residing at a memory care unit where a covid outbreak is present.  She has severe dementia and is nonverbal at baseline.  Initially there was concern of a secondary pneumonia but procalcitonin and white count are normal so antibiotics discontinued.        Interval Problem Update  6/30 Patient remains non-verbal.  Attempt to communicate with written word but no response from patient other than the appearance she was trying to read the paper.  She is currently on 1L nasal cannula.  Will continue weekly test, resulted as outpatient on 6/25 so next test would be 7/2/2020.    7/1 Patient doing about the same today.  Still requiring 1L to maintain saturations.  Per RN, she is not eating much, I will reach out to patient's son regarding interventions to help with appetite stimulant vs supportive care only.  7/2 Patient remained sleeping through my examination.  Discussed with son regarding poor appetite.  Will hold off on marinol at this time given patient's advanced age and dementia.  She continues on 1 L oxygen and hasn't had significant changes in the past 48 hours.  7/3 Patient without significant change, remains non-communicative.  She is drinking some boost and taking her medications.  COVID results should be back today.  Febrile overnight to 101.  7/4 Patient without change, still not taking in any food but is drinking water occassionally, will start LR at 75cc/hour to lessen the chance of dehydration.  I spoke to the patient's son, Denny and his wife to give an update, agrees that no feeding tube or huge interventions should be done.  If the patient's has further decline, then he would be supportive of comfort measures only but is okay with the day to day updates at this time.  He would like to see her but understands that is not possible because of the  infection.    Consultants/Specialty  none    Code Status  DNR    Disposition  TBD    Review of Systems  Review of Systems   Unable to perform ROS: Dementia        Physical Exam  Temp:  [36.4 °C (97.5 °F)-37 °C (98.6 °F)] 36.8 °C (98.2 °F)  Pulse:  [79-98] 98  Resp:  [20-24] 24  BP: (104-149)/() 130/116  SpO2:  [91 %-96 %] 96 %    Physical Exam  Vitals signs and nursing note reviewed.   Constitutional:       General: She is not in acute distress.     Appearance: Normal appearance. She is not ill-appearing.   HENT:      Head: Normocephalic and atraumatic.      Nose: Nose normal.   Neck:      Musculoskeletal: Neck supple.   Cardiovascular:      Rate and Rhythm: Normal rate and regular rhythm.      Heart sounds: Normal heart sounds. No murmur.   Pulmonary:      Effort: Pulmonary effort is normal.      Breath sounds: Normal breath sounds.   Abdominal:      General: Bowel sounds are normal. There is no distension.      Palpations: Abdomen is soft.   Musculoskeletal:         General: No swelling or tenderness.   Skin:     General: Skin is warm and dry.   Neurological:      Mental Status: She is alert.   Psychiatric:         Mood and Affect: Mood normal.         Behavior: Behavior normal.         Fluids  No intake or output data in the 24 hours ending 07/04/20 1352    Laboratory  Recent Labs     07/02/20  0338 07/03/20  0600   WBC 4.6* 5.4   RBC 5.84* 5.94*   HEMOGLOBIN 17.4* 17.9*   HEMATOCRIT 53.9* 52.7*   MCV 92.3 88.7   MCH 29.8 30.1   MCHC 32.3* 34.0   RDW 45.4 43.6   PLATELETCT 163* 219   MPV 10.2 10.1     Recent Labs     07/02/20  0338 07/03/20  0600   SODIUM 136 144   POTASSIUM 4.2 3.8   CHLORIDE 103 106   CO2 18* 22   GLUCOSE 142* 160*   BUN 27* 28*   CREATININE 0.88 0.95   CALCIUM 8.8 8.9                   Imaging  DX-CHEST-PORTABLE (1 VIEW)   Final Result      Left basilar opacities, consistent with pneumonia.           Assessment/Plan  * COVID-19 virus infection- (present on admission)  Assessment &  Plan  -Test came back positive  -Keep patient isolated  -Causing acute respiratory failure  -Patient does not have significant renal failure, at this point in time I think we can avoid IV fluids  -Not causing sepsis  - Weekly COVID test sent 7/2 - await results        Acute respiratory failure with hypoxia (HCC)- (present on admission)  Assessment & Plan  -Due to COVID-19 infection  -Patient was satting 88% on room air, improved with low flow oxygen  Procalcitonin normal  No white count  Antibiotics have been discontinued      Poor appetite  Assessment & Plan  Discussed with son, hold off on marinol for now, okay for supplements with protein shakes and he re-iterated the patient does have a sweet tooth and this usually helps get calories in her.      Hard of hearing  Assessment & Plan  Does not respond to written words despite wearing her glasses, doesn't respond to voice..      Hyperglycemia- (present on admission)  Assessment & Plan  -Is not on diabetic medications  -Start diabetic diet as well as insulin sliding scale  -Adjust as needed    Dementia (HCC)- (present on admission)  Assessment & Plan  -Chronic, patient is DNR    HTN (hypertension), benign- (present on admission)  Assessment & Plan  -Continue home hydrochlorothiazide  -Start PRN hydralazine  -Adjust as needed       VTE prophylaxis: lovenox

## 2020-07-04 NOTE — PROGRESS NOTES
Report received from TOSIN Torres. Plan of care discussed. Patient resting comfortably in bed. Safety precautions in place.

## 2020-07-04 NOTE — DISCHARGE PLANNING
SW completed chart review. Still no ability to d/c patient due to lack of response from memory care.     Issue has been elevated to supervisors and NAM.    Medicare patient, IMM at d/c.

## 2020-07-04 NOTE — PROGRESS NOTES
"PT refusing to take medications or eat food. When food offered, pt turning head, spitting out bites. Boost offered, pt turned head and shook \"no\".   "

## 2020-07-04 NOTE — CARE PLAN
Problem: Skin Integrity  Goal: Risk for impaired skin integrity will decrease  Outcome: PROGRESSING AS EXPECTED  Intervention: Assess risk factors for impaired skin integrity and/or pressure ulcers  Note: Q 2 turns, waffle cushion in place

## 2020-07-04 NOTE — CARE PLAN
Problem: Safety  Goal: Will remain free from injury  Outcome: PROGRESSING AS EXPECTED  Safety measures in place.     Problem: Skin Integrity  Goal: Risk for impaired skin integrity will decrease  Outcome: PROGRESSING AS EXPECTED

## 2020-07-05 NOTE — CARE PLAN
Problem: Safety  Goal: Will remain free from injury  Outcome: PROGRESSING AS EXPECTED  Note: Bed in low position, treaded slipper socks on, and bed alarm on.       Problem: Skin Integrity  Goal: Risk for impaired skin integrity will decrease  Outcome: PROGRESSING AS EXPECTED  Intervention: Assess risk factors for impaired skin integrity and/or pressure ulcers  Note: Monitoring pressure points. Foam in place behind hears for nc. Heel mepilex in place. Q2 turns in place.

## 2020-07-05 NOTE — PROGRESS NOTES
Spoke to pt's son, Mamadou, about pt's condition, and he will be reaching out to Dr. Pappas tomorrow to discuss a transition to comfort care.

## 2020-07-05 NOTE — PROGRESS NOTES
Pt in bed, able to make eye contact when approached.Appears to be confused and dimented. Turned and repositioned w/. Hob slightly elevated. O2 at 1liter given thru n/c. Will anticipate and monitor need.Bed alarm on.

## 2020-07-05 NOTE — PROGRESS NOTES
Hospital Medicine Daily Progress Note    Date of Service  7/5/2020    Chief Complaint  89 y.o. female admitted 6/25/2020 with hypoxia.    Hospital Course    Patient residing at a memory care unit where a covid outbreak is present.  She has severe dementia and is nonverbal at baseline.  Initially there was concern of a secondary pneumonia but procalcitonin and white count are normal so antibiotics discontinued.        Interval Problem Update  6/30 Patient remains non-verbal.  Attempt to communicate with written word but no response from patient other than the appearance she was trying to read the paper.  She is currently on 1L nasal cannula.  Will continue weekly test, resulted as outpatient on 6/25 so next test would be 7/2/2020.    7/1 Patient doing about the same today.  Still requiring 1L to maintain saturations.  Per RN, she is not eating much, I will reach out to patient's son regarding interventions to help with appetite stimulant vs supportive care only.  7/2 Patient remained sleeping through my examination.  Discussed with son regarding poor appetite.  Will hold off on marinol at this time given patient's advanced age and dementia.  She continues on 1 L oxygen and hasn't had significant changes in the past 48 hours.  7/3 Patient without significant change, remains non-communicative.  She is drinking some boost and taking her medications.  COVID results should be back today.  Febrile overnight to 101.  7/4 Patient without change, still not taking in any food but is drinking water occassionally, will start LR at 75cc/hour to lessen the chance of dehydration.  I spoke to the patient's son, Denny and his wife to give an update, agrees that no feeding tube or huge interventions should be done.  If the patient's has further decline, then he would be supportive of comfort measures only but is okay with the day to day updates at this time.  He would like to see her but understands that is not possible because of the  infection.  7/5 Patient with LR started, no change in her status.  Oxygen requirement at 1L.  She is not dehydrated based on labs, will continue low rate of ivf supplementation.  She is still not eating or drinking much.    Consultants/Specialty  none    Code Status  DNR    Disposition  TBD    Review of Systems  Review of Systems   Unable to perform ROS: Dementia        Physical Exam  Temp:  [36.2 °C (97.2 °F)-36.8 °C (98.2 °F)] 36.7 °C (98 °F)  Pulse:  [87-98] 88  Resp:  [20-24] 20  BP: (116-139)/() 116/58  SpO2:  [90 %-96 %] 90 %    Physical Exam  Vitals signs and nursing note reviewed.   Constitutional:       General: She is not in acute distress.     Appearance: Normal appearance. She is not ill-appearing.   HENT:      Head: Normocephalic and atraumatic.      Nose: Nose normal.   Neck:      Musculoskeletal: Neck supple.   Cardiovascular:      Rate and Rhythm: Normal rate and regular rhythm.      Heart sounds: Normal heart sounds. No murmur.   Pulmonary:      Effort: Pulmonary effort is normal.      Breath sounds: Normal breath sounds.   Abdominal:      General: Bowel sounds are normal. There is no distension.      Palpations: Abdomen is soft.   Musculoskeletal:         General: No swelling or tenderness.   Skin:     General: Skin is warm and dry.   Neurological:      Mental Status: She is alert.   Psychiatric:         Mood and Affect: Mood normal.         Behavior: Behavior normal.         Fluids    Intake/Output Summary (Last 24 hours) at 7/5/2020 1332  Last data filed at 7/5/2020 1000  Gross per 24 hour   Intake 900 ml   Output --   Net 900 ml       Laboratory  Recent Labs     07/03/20  0600 07/05/20  0813   WBC 5.4 7.8   RBC 5.94* 6.01*   HEMOGLOBIN 17.9* 17.9*   HEMATOCRIT 52.7* 53.6*   MCV 88.7 89.2   MCH 30.1 29.8   MCHC 34.0 33.4*   RDW 43.6 44.0   PLATELETCT 219 240   MPV 10.1 10.3     Recent Labs     07/03/20  0600 07/05/20  0813   SODIUM 144 150*   POTASSIUM 3.8 3.8   CHLORIDE 106 113*   CO2 22  24   GLUCOSE 160* 172*   BUN 28* 29*   CREATININE 0.95 0.86   CALCIUM 8.9 8.9                   Imaging  DX-CHEST-PORTABLE (1 VIEW)   Final Result      Left basilar opacities, consistent with pneumonia.           Assessment/Plan  * COVID-19 virus infection- (present on admission)  Assessment & Plan  -Test came back positive  -Keep patient isolated  -Causing acute respiratory failure  -Patient does not have significant renal failure, at this point in time I think we can avoid IV fluids  -Not causing sepsis  - Weekly COVID test sent 7/2 - positive          Acute respiratory failure with hypoxia (HCC)- (present on admission)  Assessment & Plan  -Due to COVID-19 infection  -Patient was satting 88% on room air, improved with low flow oxygen  Procalcitonin normal  No white count  Antibiotics have been discontinued      Poor appetite  Assessment & Plan  Discussed with son, hold off on marinol for now, okay for supplements with protein shakes and he re-iterated the patient does have a sweet tooth and this usually helps get calories in her.      Hard of hearing  Assessment & Plan  Does not respond to written words despite wearing her glasses, doesn't respond to voice..      Hyperglycemia- (present on admission)  Assessment & Plan  -Is not on diabetic medications  -Start diabetic diet as well as insulin sliding scale  -Adjust as needed    Dementia (HCC)- (present on admission)  Assessment & Plan  -Chronic, patient is DNR    HTN (hypertension), benign- (present on admission)  Assessment & Plan  -Continue home hydrochlorothiazide  -Start PRN hydralazine  -Adjust as needed       VTE prophylaxis: lovenox

## 2020-07-05 NOTE — PROGRESS NOTES
Received report, assumed pt care. Pt does open her eyes to voice. She is not responding to questions. Q2 turns in place and iv fluids infusing. RR 20 and spo2 92 on 2l. Will cont to monitor.

## 2020-07-06 NOTE — DISCHARGE PLANNING
LSW spoke with LEMUEL from [facility]. Per LEMUEL, pt was on service with them but was discharged because of prolonged prognosis. LEMUEL stated she has a contact at [facility] and will attempt to reach someone there. LEMUEL also informed LSW that Lamar from [facility] had put in pts notes that [facility] requires two negative Covid tests in order to return.

## 2020-07-06 NOTE — CARE PLAN
Problem: Nutritional:  Goal: Achieve adequate nutritional intake  Description: Patient will consume >50% of meals  Outcome: DISCHARGED-GOAL NOT MET   Pt is now comfort care. RD available PRN.

## 2020-07-06 NOTE — CARE PLAN
Problem: Infection  Goal: Will remain free from infection  Outcome: PROGRESSING AS EXPECTED  Intervention: Implement standard precautions and perform hand washing before and after patient contact  Note: +COVID, droplet/contact/eye protection isolation in place.     Problem: Knowledge Deficit  Goal: Knowledge of disease process/condition, treatment plan, diagnostic tests, and medications will improve  Outcome: PROGRESSING AS EXPECTED  Intervention: Assess knowledge level of disease process/condition, treatment plan, diagnostic tests, and medications  Note: Patient's knowledge of plan of care, diagnostics, and medications regularly assessed.

## 2020-07-06 NOTE — PROGRESS NOTES
Patient appearing comfortable throughout shift. Q2 turns in place, patient adjusted for maximum comfort. Remains on 3L O2. Son Denny updated via phone regarding patient condition. IV infiltrated, okay to remove and not replace per Dr. Pappas. Call light within reach. Heel float boots and waffle cushion implemented for skin protection. Comfort care orders placed, see epic. Low appetite noted.

## 2020-07-06 NOTE — CARE PLAN
Problem: Safety  Goal: Will remain free from falls  7/5/2020 2209 by Khang Buckley R.N.  Outcome: PROGRESSING AS EXPECTED    Problem: Pain Management  Goal: Pain level will decrease to patient's comfort goal  7/5/2020 2209 by Khang Buckley R.N.  Outcome: PROGRESSING AS EXPECTED

## 2020-07-06 NOTE — PROGRESS NOTES
Hospital Medicine Daily Progress Note    Date of Service  7/6/2020    Chief Complaint  89 y.o. female admitted 6/25/2020 with hypoxia.    Hospital Course    Patient residing at a memory care unit where a covid outbreak is present.  She has severe dementia and is nonverbal at baseline.  Initially there was concern of a secondary pneumonia but procalcitonin and white count are normal so antibiotics discontinued.        Interval Problem Update  6/30 Patient remains non-verbal.  Attempt to communicate with written word but no response from patient other than the appearance she was trying to read the paper.  She is currently on 1L nasal cannula.  Will continue weekly test, resulted as outpatient on 6/25 so next test would be 7/2/2020.    7/1 Patient doing about the same today.  Still requiring 1L to maintain saturations.  Per RN, she is not eating much, I will reach out to patient's son regarding interventions to help with appetite stimulant vs supportive care only.  7/2 Patient remained sleeping through my examination.  Discussed with son regarding poor appetite.  Will hold off on marinol at this time given patient's advanced age and dementia.  She continues on 1 L oxygen and hasn't had significant changes in the past 48 hours.  7/3 Patient without significant change, remains non-communicative.  She is drinking some boost and taking her medications.  COVID results should be back today.  Febrile overnight to 101.  7/4 Patient without change, still not taking in any food but is drinking water occassionally, will start LR at 75cc/hour to lessen the chance of dehydration.  I spoke to the patient's son, Denny and his wife to give an update, agrees that no feeding tube or huge interventions should be done.  If the patient's has further decline, then he would be supportive of comfort measures only but is okay with the day to day updates at this time.  He would like to see her but understands that is not possible because of the  infection.  7/5 Patient with LR started, no change in her status.  Oxygen requirement at 1L.  She is not dehydrated based on labs, will continue low rate of ivf supplementation.  She is still not eating or drinking much.  7/6 Patient continuing to refuse any PO nutrition, only small occaisional sips of water.  Discussed the continued poor prognosis with son Denny, agreeable to transition to comfort care and he will reach out to his brothers to let them know her updated situation.  She will remain in droplet isolation given active covid infection.  She is not appropriate to move back to her group home at this time because of active COVID infection - would keep her here to lessen the exposure of those who would care for her outside of the hospital.    Consultants/Specialty  none    Code Status  Comfort care    Disposition  Comfort measures here    Review of Systems  Review of Systems   Unable to perform ROS: Dementia        Physical Exam  Temp:  [36.6 °C (97.9 °F)-37.7 °C (99.8 °F)] 36.6 °C (97.9 °F)  Pulse:  [88-95] 88  Resp:  [18-20] 20  BP: (121-134)/(66-95) 134/95  SpO2:  [90 %-94 %] 94 %    Physical Exam  Vitals signs and nursing note reviewed.   Constitutional:       General: She is not in acute distress.     Appearance: Normal appearance. She is not ill-appearing.   HENT:      Head: Normocephalic and atraumatic.      Nose: Nose normal.   Neck:      Musculoskeletal: Neck supple.   Cardiovascular:      Rate and Rhythm: Normal rate and regular rhythm.      Heart sounds: Normal heart sounds. No murmur.   Pulmonary:      Effort: Pulmonary effort is normal.      Breath sounds: Normal breath sounds.   Abdominal:      General: Bowel sounds are normal. There is no distension.      Palpations: Abdomen is soft.   Musculoskeletal:         General: No swelling or tenderness.   Skin:     General: Skin is warm and dry.   Neurological:      Mental Status: She is alert.   Psychiatric:      Comments: Doesn't respond to  examination           Fluids    Intake/Output Summary (Last 24 hours) at 7/6/2020 1227  Last data filed at 7/6/2020 0810  Gross per 24 hour   Intake 118 ml   Output 0 ml   Net 118 ml       Laboratory  Recent Labs     07/05/20  0813   WBC 7.8   RBC 6.01*   HEMOGLOBIN 17.9*   HEMATOCRIT 53.6*   MCV 89.2   MCH 29.8   MCHC 33.4*   RDW 44.0   PLATELETCT 240   MPV 10.3     Recent Labs     07/05/20  0813   SODIUM 150*   POTASSIUM 3.8   CHLORIDE 113*   CO2 24   GLUCOSE 172*   BUN 29*   CREATININE 0.86   CALCIUM 8.9                   Imaging  DX-CHEST-PORTABLE (1 VIEW)   Final Result      Left basilar opacities, consistent with pneumonia.           Assessment/Plan  * COVID-19 virus infection- (present on admission)  Assessment & Plan  -Test came back positive      Comfort care       Acute respiratory failure with hypoxia (HCC)- (present on admission)  Assessment & Plan  Comfort care        Poor appetite  Assessment & Plan  Comfort care        Hard of hearing  Assessment & Plan  Does not respond to written words despite wearing her glasses, doesn't respond to voice..      Hyperglycemia- (present on admission)  Assessment & Plan  Comfort care      Dementia (HCC)- (present on admission)  Assessment & Plan  Comfort care     HTN (hypertension), benign- (present on admission)  Assessment & Plan  Comfort care          VTE prophylaxis: lovenox

## 2020-07-07 NOTE — PROGRESS NOTES
Patient comfortable in bed throughout afternoon. Q2 hour turns in place, heel float boots on. Son aware of comfort care status. Patient educated regarding comfort care, no signs of learning evidence. PRN pain or anxiety intervention not warranted at this time, non-verbal pain/anxiety assessment WNL.

## 2020-07-07 NOTE — CARE PLAN
Problem: Knowledge Deficit  Goal: Knowledge of disease process/condition, treatment plan, diagnostic tests, and medications will improve  Outcome: PROGRESSING AS EXPECTED  Intervention: Explain information regarding disease process/condition, treatment plan, diagnostic tests, and medications and document in education  Note: Comfort care plan and measures discussed with patient and son Denny.     Problem: Respiratory:  Goal: Respiratory status will improve  Outcome: PROGRESSING AS EXPECTED  Intervention: Administer and titrate oxygen therapy  Flowsheets (Taken 7/7/2020 0803)  O2 (LPM): 3  Note: On 3L oxygen at this time for comfort. Saturations WNL.

## 2020-07-07 NOTE — PROGRESS NOTES
Received patient, resting in bed, q2 turns, safety precautions in place, comfort measures done, will continue to monitor.

## 2020-07-07 NOTE — PROGRESS NOTES
Received report from Shade LEAHY. Comfort measures and Q2 hour turns in place. Patient mildly anxious upon being woken for turn and daily vitals, PRN oral ativan administered. Patient refusing breakfast, low appetite and interest noted. Safety precautions in place.

## 2020-07-07 NOTE — PROGRESS NOTES
Oxygen removed from patient per comfort care protocol. Patient saturating at 96% on room air. Turned to left side. Appears comfortable following oral ativan administration earlier this morning.

## 2020-07-08 NOTE — PROGRESS NOTES
Son Denny updated via phone call regarding patient condition. Comfort care measures in place. Q2 hour turns in effect.

## 2020-07-08 NOTE — CARE PLAN
Problem: Communication  Goal: The ability to communicate needs accurately and effectively will improve  Outcome: PROGRESSING AS EXPECTED  Intervention: Develop alternate methods of communication with patient and significant other/support system  Note: Therapeutic communication utilized.     Problem: Skin Integrity  Goal: Risk for impaired skin integrity will decrease  Outcome: PROGRESSING AS EXPECTED  Intervention: Implement precautions to protect skin integrity in collaboration with the interdisciplinary team  Flowsheets  Taken 7/8/2020 0800 by Dorcas Garibay RTyeNTye  Skin Preventative Measures:   Heel Float Boots in Use   Pillows in Use to Float Heels   Pillows in Use for Support / Positioning   Waffle Cushion  Bed Types: Pressure Redistribution Mattress (Atmosair)  Friction Interventions: Draw Sheet / Pad Used for Repositioning  Patient Turns / Repositioning: Left Side  Moisturizers: Barrier Paste  Vitamin Therapy in Use: No  Activity: Bed  Nutrition Consult Ordered: No, Consult has Already been Placed  Taken 7/4/2020 0800 by Lux Lockhart, R.N.  Patient is Receiving Nutrition: (poor intake) --  Taken 7/7/2020 2015 by Aurora Michelle RTyeNTye  Assistance / Tolerance for Turning/Repositioning: Assistance of Two or More

## 2020-07-08 NOTE — PROGRESS NOTES
Pt resting in bed, awake, calm, with unlabored breathing. Safety measures and hourly rounding in place.

## 2020-07-08 NOTE — PROGRESS NOTES
Hospital Medicine Daily Progress Note    Date of Service  7/7/2020    Chief Complaint  89 y.o. female admitted 6/25/2020 with hypoxia.    Hospital Course    Patient residing at a memory care unit where a covid outbreak is present.  She has severe dementia and is nonverbal at baseline.  Initially there was concern of a secondary pneumonia but procalcitonin and white count are normal so antibiotics discontinued.        Interval Problem Update  6/30 Patient remains non-verbal.  Attempt to communicate with written word but no response from patient other than the appearance she was trying to read the paper.  She is currently on 1L nasal cannula.  Will continue weekly test, resulted as outpatient on 6/25 so next test would be 7/2/2020.    7/1 Patient doing about the same today.  Still requiring 1L to maintain saturations.  Per RN, she is not eating much, I will reach out to patient's son regarding interventions to help with appetite stimulant vs supportive care only.  7/2 Patient remained sleeping through my examination.  Discussed with son regarding poor appetite.  Will hold off on marinol at this time given patient's advanced age and dementia.  She continues on 1 L oxygen and hasn't had significant changes in the past 48 hours.  7/3 Patient without significant change, remains non-communicative.  She is drinking some boost and taking her medications.  COVID results should be back today.  Febrile overnight to 101.  7/4 Patient without change, still not taking in any food but is drinking water occassionally, will start LR at 75cc/hour to lessen the chance of dehydration.  I spoke to the patient's son, Denny and his wife to give an update, agrees that no feeding tube or huge interventions should be done.  If the patient's has further decline, then he would be supportive of comfort measures only but is okay with the day to day updates at this time.  He would like to see her but understands that is not possible because of the  infection.  7/5 Patient with LR started, no change in her status.  Oxygen requirement at 1L.  She is not dehydrated based on labs, will continue low rate of ivf supplementation.  She is still not eating or drinking much.  7/6 Patient continuing to refuse any PO nutrition, only small occaisional sips of water.  Discussed the continued poor prognosis with son Denny, agreeable to transition to comfort care and he will reach out to his brothers to let them know her updated situation.  She will remain in droplet isolation given active covid infection.  She is not appropriate to move back to her group home at this time because of active COVID infection - would keep her here to lessen the exposure of those who would care for her outside of the hospital.  7/7 resting comfortably in bed    Code Status  Comfort care    Disposition  Comfort measures here  Will need two negative COVID tests to being able to be accepted at her previous    Review of Systems  Review of Systems   Unable to perform ROS: Dementia        Physical Exam  Temp:  [36.3 °C (97.4 °F)-37.2 °C (98.9 °F)] 36.3 °C (97.4 °F)  Pulse:  [86-95] 86  Resp:  [20] 20  BP: (126-146)/() 126/72  SpO2:  [93 %-98 %] 96 %    Physical Exam  Constitutional:       General: She is not in acute distress.     Appearance: Normal appearance. She is not ill-appearing.   HENT:      Head: Normocephalic.      Nose: Nose normal.   Neck:      Musculoskeletal: Neck supple.   Cardiovascular:      Rate and Rhythm: Normal rate and regular rhythm.      Heart sounds: Normal heart sounds. No murmur.   Pulmonary:      Effort: Pulmonary effort is normal.      Breath sounds: Normal breath sounds.   Abdominal:      General: Bowel sounds are normal. There is no distension.      Palpations: Abdomen is soft.   Musculoskeletal:         General: No swelling.   Skin:     General: Skin is warm and dry.   Neurological:      Mental Status: She is alert.   Psychiatric:      Comments: Doesn't respond to  examination           Fluids    Intake/Output Summary (Last 24 hours) at 7/7/2020 1756  Last data filed at 7/6/2020 1800  Gross per 24 hour   Intake 75 ml   Output --   Net 75 ml       Laboratory  Recent Labs     07/05/20  0813   WBC 7.8   RBC 6.01*   HEMOGLOBIN 17.9*   HEMATOCRIT 53.6*   MCV 89.2   MCH 29.8   MCHC 33.4*   RDW 44.0   PLATELETCT 240   MPV 10.3     Recent Labs     07/05/20  0813   SODIUM 150*   POTASSIUM 3.8   CHLORIDE 113*   CO2 24   GLUCOSE 172*   BUN 29*   CREATININE 0.86   CALCIUM 8.9                   Imaging  DX-CHEST-PORTABLE (1 VIEW)   Final Result      Left basilar opacities, consistent with pneumonia.           Assessment/Plan  * COVID-19 virus infection- (present on admission)  Assessment & Plan  -Test came back positive      Comfort care       Acute respiratory failure with hypoxia (HCC)- (present on admission)  Assessment & Plan  Comfort care        Poor appetite  Assessment & Plan  Comfort care        Hard of hearing  Assessment & Plan  Does not respond to written words despite wearing her glasses, doesn't respond to voice..      Hyperglycemia- (present on admission)  Assessment & Plan  Comfort care      Dementia (HCC)- (present on admission)  Assessment & Plan  Comfort care     HTN (hypertension), benign- (present on admission)  Assessment & Plan  Comfort care          VTE prophylaxis: lovenox

## 2020-07-08 NOTE — CARE PLAN
Problem: Safety  Goal: Will remain free from falls  Outcome: PROGRESSING AS EXPECTED  Intervention: Implement fall precautions  Flowsheets (Taken 7/7/2020 2015)  Environmental Precautions:   Treaded Slipper Socks on Patient   Personal Belongings, Wastebasket, Call Bell etc. in Easy Reach   Transferred to Stronger Side   Report Given to Other Health Care Providers Regarding Fall Risk   Bed in Low Position   Communication Sign for Patients & Families   Mobility Assessed & Appropriate Sign Placed     Problem: Skin Integrity  Goal: Risk for impaired skin integrity will decrease  Outcome: PROGRESSING AS EXPECTED  Intervention: Implement precautions to protect skin integrity in collaboration with the interdisciplinary team  Flowsheets  Taken 7/7/2020 2015  Skin Preventative Measures:   Pillows in Use for Support / Positioning   Pillows in Use to Float Heels   Waffle Cushion  Bed Types: Pressure Redistribution Mattress (Atmosair)  Friction Interventions: Draw Sheet / Pad Used for Repositioning  Taken 7/7/2020 6951  Moisturizers: Barrier Cream     Problem: Pain Management  Goal: Pain level will decrease to patient's comfort goal  Outcome: PROGRESSING AS EXPECTED

## 2020-07-09 NOTE — CARE PLAN
Problem: Safety  Goal: Will remain free from injury  Outcome: PROGRESSING AS EXPECTED  Goal: Will remain free from falls  Outcome: PROGRESSING AS EXPECTED   Safety measures in place      Problem: Venous Thromboembolism (VTW)/Deep Vein Thrombosis (DVT) Prevention:  Goal: Patient will participate in Venous Thrombosis (VTE)/Deep Vein Thrombosis (DVT)Prevention Measures  Outcome: PROGRESSING AS EXPECTED   lovenox

## 2020-07-09 NOTE — PROGRESS NOTES
Report received from TOSIN Jaquez. Pt resting comfortably in bed. Declines any needs at this time. Call light within reach, bed low/locked, bed alarm on. Will continue to monitor.

## 2020-07-09 NOTE — PROGRESS NOTES
Hospital Medicine Daily Progress Note    Date of Service  7/8/2020    Chief Complaint  89 y.o. female admitted 6/25/2020 with hypoxia.    Hospital Course    Patient residing at a memory care unit where a covid outbreak is present.  She has severe dementia and is nonverbal at baseline.  Initially there was concern of a secondary pneumonia but procalcitonin and white count are normal so antibiotics discontinued.        Interval Problem Update  6/30 Patient remains non-verbal.  Attempt to communicate with written word but no response from patient other than the appearance she was trying to read the paper.  She is currently on 1L nasal cannula.  Will continue weekly test, resulted as outpatient on 6/25 so next test would be 7/2/2020.    7/1 Patient doing about the same today.  Still requiring 1L to maintain saturations.  Per RN, she is not eating much, I will reach out to patient's son regarding interventions to help with appetite stimulant vs supportive care only.  7/2 Patient remained sleeping through my examination.  Discussed with son regarding poor appetite.  Will hold off on marinol at this time given patient's advanced age and dementia.  She continues on 1 L oxygen and hasn't had significant changes in the past 48 hours.  7/3 Patient without significant change, remains non-communicative.  She is drinking some boost and taking her medications.  COVID results should be back today.  Febrile overnight to 101.  7/4 Patient without change, still not taking in any food but is drinking water occassionally, will start LR at 75cc/hour to lessen the chance of dehydration.  I spoke to the patient's son, Denny and his wife to give an update, agrees that no feeding tube or huge interventions should be done.  If the patient's has further decline, then he would be supportive of comfort measures only but is okay with the day to day updates at this time.  He would like to see her but understands that is not possible because of the  infection.  7/5 Patient with LR started, no change in her status.  Oxygen requirement at 1L.  She is not dehydrated based on labs, will continue low rate of ivf supplementation.  She is still not eating or drinking much.  7/6 Patient continuing to refuse any PO nutrition, only small occaisional sips of water.  Discussed the continued poor prognosis with son Denny, agreeable to transition to comfort care and he will reach out to his brothers to let them know her updated situation.  She will remain in droplet isolation given active covid infection.  She is not appropriate to move back to her group home at this time because of active COVID infection - would keep her here to lessen the exposure of those who would care for her outside of the hospital.  7/7 resting comfortably in bed  7/8 repeating COVID test for resolution.  No change in patient condition    Code Status  Comfort care    Disposition  Comfort measures here  Will need two negative COVID tests to being able to be accepted at her previous    Review of Systems  Review of Systems   Unable to perform ROS: Dementia        Physical Exam  Pulse:  [93] 93  Resp:  [20] 20  BP: (154)/(80) 154/80  SpO2:  [86 %] 86 %    Physical Exam  Constitutional:       General: She is not in acute distress.  HENT:      Head: Normocephalic.      Nose: Nose normal.   Neck:      Musculoskeletal: Neck supple.   Cardiovascular:      Rate and Rhythm: Normal rate.      Heart sounds: No gallop.    Pulmonary:      Effort: Pulmonary effort is normal.      Breath sounds: Normal breath sounds.   Abdominal:      General: Bowel sounds are normal. There is no distension.   Musculoskeletal:         General: No swelling.   Skin:     General: Skin is warm and dry.   Psychiatric:      Comments: Doesn't respond to examination           Fluids  No intake or output data in the 24 hours ending 07/08/20 1704    Laboratory                        Imaging  DX-CHEST-PORTABLE (1 VIEW)   Final Result      Left  basilar opacities, consistent with pneumonia.           Assessment/Plan  * COVID-19 virus infection- (present on admission)  Assessment & Plan  -Test came back positive  Now testing for resolution    Comfort care       Acute respiratory failure with hypoxia (HCC)- (present on admission)  Assessment & Plan  Comfort care        Poor appetite  Assessment & Plan  Comfort care        Hard of hearing  Assessment & Plan  Does not respond to written words despite wearing her glasses, doesn't respond to voice..      Hyperglycemia- (present on admission)  Assessment & Plan  Comfort care      Dementia (HCC)- (present on admission)  Assessment & Plan  Comfort care     HTN (hypertension), benign- (present on admission)  Assessment & Plan  Comfort care          VTE prophylaxis: Not on comfort care

## 2020-07-09 NOTE — CARE PLAN
Problem: Pain Management  Goal: Pain level will decrease to patient's comfort goal  Outcome: PROGRESSING SLOWER THAN EXPECTED   Patient moaning and grimacing when moved and when environmental stimuli increased. Environmental stimuli decreased and pain medication given via MAR to promote comfort. Will continue to monitor comfort measures at this time.     Problem: Urinary Elimination:  Goal: Ability to reestablish a normal urinary elimination pattern will improve  Outcome: PROGRESSING SLOWER THAN EXPECTED  Patient incontinent of bowel and bladder. Coordination with CNAs in place to help prevent skin breakdown. Will continue to monitor urinary output and skin integrity.     Problem: Safety  Goal: Will remain free from injury  Outcome: PROGRESSING AS EXPECTED   Safety precautions in place: side rails up, bed alarm on, room close to nurses station and bed locked and in lowest position.

## 2020-07-09 NOTE — PROGRESS NOTES
Received report from day shift RN. Assumed care of patient. Patient is currently resting in bed comfortably. Patient VSS. Patient on room air for comfort.  Patient mostly nonverbal. BOONE neuro status. Respirations even and unlabored. Skin intact but fragile. No IV present. Patient on droplet, contact and eye protection at this time. Plan of care reviewed in regards to promoting comfort. No further needs requested at this time. Will continue to monitor at this time. Bed locked and in lowest position with call light within reach.

## 2020-07-10 NOTE — PROGRESS NOTES
[id number]: Received report from toan Chavez RN. Pt status and POC discussed. Pt is in bed, restless, moaning, prn roxanol given per emar. Pt had x1 urine incontinence, octavio care done, purewick placed. Pt does not tolerate repositioning well, will yell out when moved.  2030: pt resting with eyes closed, calm with unlabored breathing.   0000: Sleeping in bed, calm with unlabored breathing. Safety and fall precautions in place.   0147: pt moaning and restless, prn ativan and roxanol given per emar.   0500: Sleeping in bed, calm with unlabored breathing. Safety and fall precautions in place.   0700: Report given to TOSIN Chavez.

## 2020-07-10 NOTE — PROGRESS NOTES
Hospital Medicine Daily Progress Note    Date of Service  7/9/2020    Chief Complaint  89 y.o. female admitted 6/25/2020 with hypoxia.    Hospital Course    Patient residing at a memory care unit where a covid outbreak is present.  She has severe dementia and is nonverbal at baseline.  Initially there was concern of a secondary pneumonia but procalcitonin and white count are normal so antibiotics discontinued.        Interval Problem Update  6/30 Patient remains non-verbal.  Attempt to communicate with written word but no response from patient other than the appearance she was trying to read the paper.  She is currently on 1L nasal cannula.  Will continue weekly test, resulted as outpatient on 6/25 so next test would be 7/2/2020.    7/1 Patient doing about the same today.  Still requiring 1L to maintain saturations.  Per RN, she is not eating much, I will reach out to patient's son regarding interventions to help with appetite stimulant vs supportive care only.  7/2 Patient remained sleeping through my examination.  Discussed with son regarding poor appetite.  Will hold off on marinol at this time given patient's advanced age and dementia.  She continues on 1 L oxygen and hasn't had significant changes in the past 48 hours.  7/3 Patient without significant change, remains non-communicative.  She is drinking some boost and taking her medications.  COVID results should be back today.  Febrile overnight to 101.  7/4 Patient without change, still not taking in any food but is drinking water occassionally, will start LR at 75cc/hour to lessen the chance of dehydration.  I spoke to the patient's son, Denny and his wife to give an update, agrees that no feeding tube or huge interventions should be done.  If the patient's has further decline, then he would be supportive of comfort measures only but is okay with the day to day updates at this time.  He would like to see her but understands that is not possible because of the  infection.  7/5 Patient with LR started, no change in her status.  Oxygen requirement at 1L.  She is not dehydrated based on labs, will continue low rate of ivf supplementation.  She is still not eating or drinking much.  7/6 Patient continuing to refuse any PO nutrition, only small occaisional sips of water.  Discussed the continued poor prognosis with son Denny, agreeable to transition to comfort care and he will reach out to his brothers to let them know her updated situation.  She will remain in droplet isolation given active covid infection.  She is not appropriate to move back to her group home at this time because of active COVID infection - would keep her here to lessen the exposure of those who would care for her outside of the hospital.  7/7 resting comfortably in bed  7/8 repeating COVID test for resolution.  No change in patient condition  7/9 COVID test again positive    Code Status  Comfort care    Disposition  Comfort measures here  Will need two negative COVID tests to being able to be accepted at her previous    Review of Systems  Review of Systems   Unable to perform ROS: Dementia        Physical Exam  Temp:  [36.3 °C (97.4 °F)] 36.3 °C (97.4 °F)  Pulse:  [97] 97  Resp:  [19] 19  BP: (138)/(77) 138/77  SpO2:  [86 %] 86 %    Physical Exam  Constitutional:       General: She is not in acute distress.  HENT:      Head: Normocephalic.      Nose: Nose normal.   Neck:      Musculoskeletal: Neck supple.   Cardiovascular:      Rate and Rhythm: Normal rate.      Heart sounds: No gallop.    Pulmonary:      Effort: Pulmonary effort is normal.      Breath sounds: Normal breath sounds.   Abdominal:      General: Bowel sounds are normal. There is no distension.   Musculoskeletal:         General: No swelling.   Skin:     General: Skin is warm and dry.   Psychiatric:      Comments: Does not respond verbally but does track with eyes         Fluids  No intake or output data in the 24 hours ending 07/09/20  1704    Laboratory                        Imaging  DX-CHEST-PORTABLE (1 VIEW)   Final Result      Left basilar opacities, consistent with pneumonia.           Assessment/Plan  * COVID-19 virus infection- (present on admission)  Assessment & Plan  -Test came back positive initially and at repeat  Now testing for resolution    Comfort care       Acute respiratory failure with hypoxia (HCC)- (present on admission)  Assessment & Plan  Comfort care        Poor appetite  Assessment & Plan  Comfort care        Hard of hearing  Assessment & Plan  Does not respond to written words despite wearing her glasses, doesn't respond to voice..      Hyperglycemia- (present on admission)  Assessment & Plan  Comfort care      Dementia (HCC)- (present on admission)  Assessment & Plan  Comfort care     HTN (hypertension), benign- (present on admission)  Assessment & Plan  Comfort care          VTE prophylaxis: Not on comfort care

## 2020-07-10 NOTE — CARE PLAN
Problem: Communication  Goal: The ability to communicate needs accurately and effectively will improve  Outcome: PROGRESSING AS EXPECTED   Pt verbalizes needs      Problem: Safety  Goal: Will remain free from injury  Outcome: PROGRESSING AS EXPECTED  Goal: Will remain free from falls  Outcome: PROGRESSING AS EXPECTED   Safety measures in place

## 2020-07-11 NOTE — PROGRESS NOTES
Focused assessment complete. Pt appears restless and is moaning at times. Pt medicated per MAR for comfort. Respirations are a normal rate and rhythm, mild WOB. Mouth swabbed. Pillows in place to float heels and for additional support. Pt displays discomfort and pain when physically moved. Pt has been anuric but purwick in place if needed. Safety precautions in place. Bed alarm on.

## 2020-07-11 NOTE — PROGRESS NOTES
Pt was found to be  during hourly rounding at 0234. 2 RN pronunciation per orders. ALIA notified. Dr. Rosario notified. Son Mamadou notified. He prefers to use [facility]. Family will come for belongings later in the day. Tissue bank notified. Belonging bagged for family.  phoned at 0325, waiting for call back. Then will call [facility] and perform postmortem care. See expiration navigator for complete documentation.

## 2020-07-11 NOTE — PROGRESS NOTES
0645 pt resting in bed, appears comfortable, respirations even and unlabored    0800 pt agitated, RR 20, pt moaning/grimacing/agitated/crying out, medication given per MAR for comfort    0900 pt resting in bed, respirations even and unlabored    1000 pt resting in bed, respirations even and unlabored    1200  pt agitated, RR 20, pt moaning/grimacing/agitated/crying out, medication given per MAR for comfort    [id number] pt resting in bed, respirations even and unlabored

## 2020-07-11 NOTE — CARE PLAN
Problem: Safety  Goal: Will remain free from injury  Outcome: PROGRESSING AS EXPECTED  Note: Bed alarm in use, Hourly rounding complete, mepilex in place on sacrum and heels to prevent skin breakdown.     Problem: Pain Management  Goal: Pain level will decrease to patient's comfort goal  Outcome: PROGRESSING AS EXPECTED  Note: Nonverbal pain descriptors utilized to assess pain and comfort. PRN medications given per MAR to provide comfort.

## 2020-07-11 NOTE — DISCHARGE SUMMARY
Hospital Medicine Death Note     Admit Date:  6/25/2020       Death Date:   7/11/2020    Attending Physician:  Velasquez Mederos M.D.     Diagnoses:   Principal Problem:    COVID-19 virus infection POA: Yes  Active Problems:    Acute respiratory failure with hypoxia (HCC) POA: Yes    HTN (hypertension), benign POA: Yes    Dementia (HCC) POA: Yes    Hyperglycemia POA: Yes    Hard of hearing POA: Unknown    Poor appetite POA: Unknown  Resolved Problems:    * No resolved hospital problems. *       Cause of death:   Respiratory failure 2/2 COVID-19 infection    Hospital Summary (Brief Narrative):       89 y.o. female who presented 6/25/2020 with positive COVID test and hypoxia.  Patient does have a history of dementia, is at a memory care unit. Family members did reiterate that she was DNR.  Patient was on hospice at some point but was not at admission. Apparently there was an outbreak of COVID at the memory care unit.    Patient was admitted and initially antibiotics were started.  However white count was normal so antibiotics were discontinued.  Patient remained nonverbal which was her baseline.  She continued to be hypoxic.  She was tested to be positive for COVID.  She still did not eat any food for several days so conversation was had with the son and his wife and they decided on no feeding tube or huge interventions.  Patient was started on IV fluids but still would not eat or drink much.  Eventually the son reached out to his brothers and changed her to comfort care.  She eventually passed away peacefully.     Consultants:      None    Time spent on discharge day patient visit: 42 minutes    Imaging/ Testing:      DX-CHEST-PORTABLE (1 VIEW)   Final Result      Left basilar opacities, consistent with pneumonia.

## 2020-07-11 NOTE — PROGRESS NOTES
Pt resting comfortably in bed with eyes closed. Unlabored breathing. Pt carefully repositioned minimally to prevent skin breakdown but pt indicates pain with movement. Heels floated. Safety precautions in place. Call light in reach.

## 2024-04-19 NOTE — PROGRESS NOTES
1515 pt tolerated tx. D/c from clinic.   Hospital Medicine Daily Progress Note    Date of Service  7/10/2020    Chief Complaint  89 y.o. female admitted 6/25/2020 with hypoxia.    Hospital Course    Patient residing at a memory care unit where a covid outbreak is present.  She has severe dementia and is nonverbal at baseline.  Initially there was concern of a secondary pneumonia but procalcitonin and white count are normal so antibiotics discontinued.        Interval Problem Update  6/30 Patient remains non-verbal.  Attempt to communicate with written word but no response from patient other than the appearance she was trying to read the paper.  She is currently on 1L nasal cannula.  Will continue weekly test, resulted as outpatient on 6/25 so next test would be 7/2/2020.    7/1 Patient doing about the same today.  Still requiring 1L to maintain saturations.  Per RN, she is not eating much, I will reach out to patient's son regarding interventions to help with appetite stimulant vs supportive care only.  7/2 Patient remained sleeping through my examination.  Discussed with son regarding poor appetite.  Will hold off on marinol at this time given patient's advanced age and dementia.  She continues on 1 L oxygen and hasn't had significant changes in the past 48 hours.  7/3 Patient without significant change, remains non-communicative.  She is drinking some boost and taking her medications.  COVID results should be back today.  Febrile overnight to 101.  7/4 Patient without change, still not taking in any food but is drinking water occassionally, will start LR at 75cc/hour to lessen the chance of dehydration.  I spoke to the patient's son, Denny and his wife to give an update, agrees that no feeding tube or huge interventions should be done.  If the patient's has further decline, then he would be supportive of comfort measures only but is okay with the day to day updates at this time.  He would like to see her but understands that is not possible because of the  infection.  7/5 Patient with LR started, no change in her status.  Oxygen requirement at 1L.  She is not dehydrated based on labs, will continue low rate of ivf supplementation.  She is still not eating or drinking much.  7/6 Patient continuing to refuse any PO nutrition, only small occaisional sips of water.  Discussed the continued poor prognosis with son Denny, agreeable to transition to comfort care and he will reach out to his brothers to let them know her updated situation.  She will remain in droplet isolation given active covid infection.  She is not appropriate to move back to her group home at this time because of active COVID infection - would keep her here to lessen the exposure of those who would care for her outside of the hospital.  7/7 resting comfortably in bed  7/8 repeating COVID test for resolution.  No change in patient condition  7/9 COVID test again positive  7/10 did have some restlessness but was able to be medicated.    Code Status  Comfort care    Disposition  Comfort measures here  Will need two negative COVID tests to being able to be accepted at her previous facility    Review of Systems  Review of Systems   Unable to perform ROS: Dementia        Physical Exam  Temp:  [36.6 °C (97.8 °F)] 36.6 °C (97.8 °F)  Pulse:  [114] 114  Resp:  [20-24] 20  BP: (128)/(72) 128/72  SpO2:  [80 %] 80 %    Physical Exam  Constitutional:       General: She is not in acute distress.  HENT:      Head: Normocephalic.      Nose: Nose normal.   Neck:      Musculoskeletal: Neck supple.   Cardiovascular:      Rate and Rhythm: Normal rate.      Heart sounds: No gallop.    Pulmonary:      Effort: Pulmonary effort is normal.      Breath sounds: Normal breath sounds.   Abdominal:      General: Bowel sounds are normal. There is no distension.   Musculoskeletal:         General: No swelling.   Skin:     General: Skin is warm and dry.   Neurological:      Mental Status: Mental status is at baseline.   Psychiatric:       Comments: Does not respond verbally but does track with eyes         Fluids  No intake or output data in the 24 hours ending 07/10/20 8715    Laboratory                        Imaging  DX-CHEST-PORTABLE (1 VIEW)   Final Result      Left basilar opacities, consistent with pneumonia.           Assessment/Plan  * COVID-19 virus infection- (present on admission)  Assessment & Plan  -Test came back positive initially and at repeat  Now testing for resolution    Comfort care       Acute respiratory failure with hypoxia (HCC)- (present on admission)  Assessment & Plan  Comfort care        Poor appetite  Assessment & Plan  Comfort care        Hard of hearing  Assessment & Plan  Does not respond to written words despite wearing her glasses, doesn't respond to voice..  Does track with her eyes however.    Hyperglycemia- (present on admission)  Assessment & Plan  Comfort care      Dementia (HCC)- (present on admission)  Assessment & Plan  Comfort care     HTN (hypertension), benign- (present on admission)  Assessment & Plan  Comfort care          VTE prophylaxis: Now on comfort care